# Patient Record
Sex: FEMALE | Race: WHITE | Employment: UNEMPLOYED | URBAN - METROPOLITAN AREA
[De-identification: names, ages, dates, MRNs, and addresses within clinical notes are randomized per-mention and may not be internally consistent; named-entity substitution may affect disease eponyms.]

---

## 2017-01-11 ENCOUNTER — OFFICE VISIT (OUTPATIENT)
Dept: CARDIOLOGY CLINIC | Age: 27
End: 2017-01-11

## 2017-01-11 VITALS
OXYGEN SATURATION: 98 % | HEIGHT: 61 IN | DIASTOLIC BLOOD PRESSURE: 70 MMHG | SYSTOLIC BLOOD PRESSURE: 104 MMHG | WEIGHT: 170.6 LBS | HEART RATE: 64 BPM | RESPIRATION RATE: 16 BRPM | BODY MASS INDEX: 32.21 KG/M2

## 2017-01-11 DIAGNOSIS — R55 SYNCOPE AND COLLAPSE: ICD-10-CM

## 2017-01-11 DIAGNOSIS — R00.0 INAPPROPRIATE SINUS TACHYCARDIA: ICD-10-CM

## 2017-01-11 DIAGNOSIS — R00.0 TACHYCARDIA: Primary | ICD-10-CM

## 2017-01-11 RX ORDER — TOPIRAMATE 50 MG/1
50 TABLET, FILM COATED ORAL 2 TIMES DAILY
COMMUNITY
Start: 2016-12-20 | End: 2017-12-27

## 2017-01-11 RX ORDER — ESCITALOPRAM OXALATE 20 MG/1
20 TABLET ORAL DAILY
COMMUNITY
Start: 2016-12-27

## 2017-01-11 RX ORDER — TRAZODONE HYDROCHLORIDE 50 MG/1
50 TABLET ORAL
COMMUNITY
Start: 2016-12-24 | End: 2017-12-27

## 2017-01-11 NOTE — PROGRESS NOTES
Extended / Orthostatic Vitals:  Patient Position 2: Supine  BP 2: 104/70  Pulse 2: 64  Patient Position 3: Standing  BP 3: 108/80  Pulse 3: 93

## 2017-01-11 NOTE — PROGRESS NOTES
HISTORY OF PRESENTING ILLNESS      Todd Balbuena is a 23 y.o. female established patient with history of CMT who has had episodes of extreme fatigue, lightheadedness, and dizziness that can occur with random activities including bending, sitting, and climbing stairs that were initially associated with HR's ranging from . She has been labeled with POTS in the past and has been on midodrine and florinef. She has also had episodes of syncope. Ivabradine was continued and metoprolol was lowered to 12.5 mg BID and ultimately discontinued due to bradycardia. Subsequent 30 day monitor demonstrated episodes of ST with rates as high as the 160s and revealed numerous episodes of lightheadedness, dizziness, and chest pain that were associated with normal SR, and at least one episode where her HR was in the 160s, asx. Vital signs in the past failed to demonstrate orthostasis. Diltiazem has also resulted in bradycardia. We had discussed sinus node modification however she opted to continue attempt at drug therapy. She was intolerant of flecainide and was thus changed to Rythmol and she now presents for follow up. Overall, she has noticed a significant improvement in her HR control with the highest HR noted during normal exertion now being around the low 100's. However, resting heart rates are in the mid 50's at rest with associated fatigue.        ACTIVE PROBLEM LIST     Patient Active Problem List    Diagnosis Date Noted    Inappropriate sinus tachycardia ([address] 08/11/2016    Tachycardia 07/08/2015    Syncope and collapse 06/27/2012    Bipolar disorder, unspecified ([address] 06/27/2012    Orthostatic hypotension 06/27/2012    UTI (urinary tract infection) 06/27/2012           PAST MEDICAL HISTORY     Past Medical History   Diagnosis Date    Asthma     Orthostatic hypotension     Other ill-defined conditions(799.89)      neurologicaly induced CMT    Psychiatric disorder      bipolar           PAST SURGICAL HISTORY Past Surgical History   Procedure Laterality Date    Hx orthopaedic       bilat feet          ALLERGIES     Allergies   Allergen Reactions    Benadryl [Diphenhydramine Hcl] Other (comments)     hallucinations    Morphine Shortness of Breath          FAMILY HISTORY     Family History   Problem Relation Age of Onset    Cancer Mother     Alcohol abuse Maternal Aunt     Diabetes Maternal Grandmother     Hypertension Maternal Grandmother     Cancer Paternal Grandmother     Diabetes Paternal Grandmother     Alcohol abuse Paternal Grandfather     negative for cardiac disease       SOCIAL HISTORY     Social History     Social History    Marital status: SINGLE     Spouse name: N/A    Number of children: N/A    Years of education: N/A     Social History Main Topics    Smoking status: Never Smoker    Smokeless tobacco: Never Used    Alcohol use No    Drug use: No    Sexual activity: Yes     Partners: Male     Birth control/ protection: Inserts     Other Topics Concern    None     Social History Narrative         MEDICATIONS     Current Outpatient Prescriptions   Medication Sig    traZODone (DESYREL) 50 mg tablet Take 50 mg by mouth nightly.  escitalopram oxalate (LEXAPRO) 20 mg tablet Take 20 mg by mouth daily.  topiramate (TOPAMAX) 50 mg tablet Take 50 mg by mouth two (2) times a day.  propafenone SR (RYTHMOL SR) 225 mg SR capsule Take 1 Cap by mouth two (2) times a day.  clonazePAM (KLONOPIN) 0.5 mg tablet Take 0.5 mg by mouth four (4) times daily.  ivabradine (CORLANOR) 7.5 mg tablet Take 1 Tab by mouth two (2) times daily (with meals).  LAMOTRIGINE (LAMICTAL PO) Take 100 mg by mouth daily.  methylphenidate (CONCERTA) 18 mg CR tablet Take 18 mg by mouth every morning.  NORETHINDRONE (JOLIVETTE PO) Take 0.28 mg by mouth daily.  buPROPion SR (WELLBUTRIN SR) 100 mg SR tablet Take 100 mg by mouth daily. No current facility-administered medications for this visit.         I have reviewed the nurses notes, vitals, problem list, allergy list, medical history, family, social history and medications. REVIEW OF SYMPTOMS      General: Pt denies excessive weight gain or loss. Pt is able to conduct ADL's  HEENT: Denies blurred vision, headaches, hearing loss, epistaxis and difficulty swallowing. Respiratory: Denies cough, congestion, shortness of breath, BRANDON, wheezing or stridor. Cardiovascular: Denies precordial pain, palpitations, edema or PND  Gastrointestinal: Denies poor appetite, indigestion, abdominal pain or blood in stool  Genitourinary: Denies hematuria, dysuria, increased urinary frequency  Musculoskeletal: Denies joint pain or swelling from muscles or joints  Neurologic: Denies tremor, paresthesias, headache, or sensory motor disturbance  Psychiatric: Denies confusion, insomnia, depression  Integumentray: Denies rash, itching or ulcers. Hematologic: Denies easy bruising, bleeding     PHYSICAL EXAMINATION      Vitals:    01/11/17 1144   BP: 104/70   Pulse: 64   Resp: 16   SpO2: 98%   Weight: 170 lb 9.6 oz (77.4 kg)   Height: 5' 1\" (1.549 m)     General: Well developed, in no acute distress. HEENT: No jaundice, oral mucosa moist, no oral ulcers  Neck: Supple, no stiffness, no lymphadenopathy, supple  Heart:  Normal S1/S2 negative S3 or S4. Regular, no murmur, gallop or rub, no jugular venous distention  Respiratory: Clear bilaterally x 4, no wheezing or rales  Abdomen:   Soft, non-tender, bowel sounds are active.   Extremities:  No edema, normal cap refill, no cyanosis. Musculoskeletal: No clubbing, no deformities  Neuro: A&Ox3, speech clear, gait stable, cooperative, no focal neurologic deficits  Skin: Skin color is normal. No rashes or lesions.  Non diaphoretic, moist.  Vascular: 2+ pulses symmetric in all extremities       DIAGNOSTIC DATA      EKG:        LABORATORY DATA      Lab Results   Component Value Date/Time    WBC 5.5 06/27/2012 07:50 PM    HGB 14.5 06/27/2012 07:50 PM    HCT 41.3 06/27/2012 07:50 PM    PLATELET 528 53/89/6310 07:50 PM    MCV 88.2 06/27/2012 07:50 PM      Lab Results   Component Value Date/Time    Sodium 141 06/27/2012 07:50 PM    Potassium 4.2 06/27/2012 07:50 PM    Chloride 106 06/27/2012 07:50 PM    CO2 29 06/27/2012 07:50 PM    Anion gap 6 06/27/2012 07:50 PM    Glucose 83 06/27/2012 07:50 PM    BUN 7 06/27/2012 07:50 PM    Creatinine 0.8 06/27/2012 07:50 PM    BUN/Creatinine ratio 9 06/27/2012 07:50 PM    GFR est AA >60 06/27/2012 07:50 PM    GFR est non-AA >60 06/27/2012 07:50 PM    Calcium 9.2 06/27/2012 07:50 PM    Bilirubin, total 0.2 06/27/2012 07:50 PM    ALT 37 06/27/2012 07:50 PM    AST 14 06/27/2012 07:50 PM    Alk. phosphatase 78 06/27/2012 07:50 PM    Protein, total 7.6 06/27/2012 07:50 PM    Albumin 3.7 06/27/2012 07:50 PM    Globulin 3.9 06/27/2012 07:50 PM    A-G Ratio 0.9 06/27/2012 07:50 PM           ASSESSMENT      1. CMT  2. Dizziness and lightheadedness              A. Occuring randomly with bending, sitting, climbing stairs  3. POTS vs. IST              A. Narrow complex tachycardia consistent with ST on 30 day monitor  4. Fatigue  5. Syncope        PLAN     IST is better controlled however will reduce Corlanor to 5 mg to lessen bradycardic effect while continuing current dose of propafenone. Follow up 3 months        Thank you,  Rosaura Glover. Zoila Stokes MD for involving me in the care of this extraordinarily pleasant female. Please do not hesitate to contact me for further questions/concerns.      Written by Isaac Mercedes, as dictated by Pearley Burkitt, MD.     Pearley Burkitt, MD  Cardiac Electrophysiology / Cardiology    [address] [facility] [address]  [address] [facility], [facility], [address]  [phone] / [phone] Fax   [phone] / [phone] Fax

## 2017-03-10 ENCOUNTER — IP HISTORICAL/CONVERTED ENCOUNTER (OUTPATIENT)
Dept: OTHER | Age: 27
End: 2017-03-10

## 2017-05-03 RX ORDER — PROPAFENONE HYDROCHLORIDE 225 MG/1
CAPSULE, EXTENDED RELEASE ORAL
Qty: 60 CAP | Refills: 2 | Status: SHIPPED | OUTPATIENT
Start: 2017-05-03 | End: 2017-12-27

## 2017-05-03 NOTE — TELEPHONE ENCOUNTER
Requested Prescriptions     Signed Prescriptions Disp Refills    propafenone SR (RYTHMOL SR) 225 mg SR capsule 60 Cap 2     Sig: TAKE ONE CAPSULE BY MOUTH TWICE DAILY     Authorizing Provider: Agapito Abarca     Ordering User: Ricardo Lebron

## 2017-09-07 ENCOUNTER — HOSPITAL ENCOUNTER (EMERGENCY)
Age: 27
Discharge: HOME | End: 2017-09-07
Attending: EMERGENCY MEDICINE
Payer: COMMERCIAL

## 2017-09-07 ENCOUNTER — APPOINTMENT (OUTPATIENT)
Dept: CT IMAGING | Age: 27
End: 2017-09-07
Attending: PHYSICIAN ASSISTANT
Payer: COMMERCIAL

## 2017-09-07 ENCOUNTER — APPOINTMENT (OUTPATIENT)
Dept: GENERAL RADIOLOGY | Age: 27
End: 2017-09-07
Attending: PHYSICIAN ASSISTANT
Payer: COMMERCIAL

## 2017-09-07 VITALS
DIASTOLIC BLOOD PRESSURE: 74 MMHG | BODY MASS INDEX: 34.36 KG/M2 | HEIGHT: 60 IN | HEART RATE: 85 BPM | OXYGEN SATURATION: 100 % | WEIGHT: 175 LBS | SYSTOLIC BLOOD PRESSURE: 107 MMHG | RESPIRATION RATE: 18 BRPM | TEMPERATURE: 97.8 F

## 2017-09-07 DIAGNOSIS — M25.562 ACUTE PAIN OF BOTH KNEES: ICD-10-CM

## 2017-09-07 DIAGNOSIS — M54.6 ACUTE MIDLINE THORACIC BACK PAIN: ICD-10-CM

## 2017-09-07 DIAGNOSIS — G44.309 POST-TRAUMATIC HEADACHE, NOT INTRACTABLE, UNSPECIFIED CHRONICITY PATTERN: ICD-10-CM

## 2017-09-07 DIAGNOSIS — S16.1XXA CERVICAL STRAIN, INITIAL ENCOUNTER: Primary | ICD-10-CM
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
## 2017-09-07 DIAGNOSIS — M25.561 ACUTE PAIN OF BOTH KNEES: ICD-10-CM

## 2017-09-07 DIAGNOSIS — R10.84 ABDOMINAL PAIN, GENERALIZED: ICD-10-CM

## 2017-09-07 LAB
ALBUMIN SERPL-MCNC: 3.6 G/DL (ref 3.5–5)
ALBUMIN/GLOB SERPL: 0.9 {RATIO} (ref 1.1–2.2)
ALP SERPL-CCNC: 83 U/L (ref 45–117)
ALT SERPL-CCNC: 27 U/L (ref 12–78)
ANION GAP BLD CALC-SCNC: 18 MMOL/L (ref 5–15)
APPEARANCE UR: ABNORMAL
AST SERPL-CCNC: 13 U/L (ref 15–37)
BACTERIA URNS QL MICRO: NEGATIVE /HPF
BASOPHILS # BLD: 0 K/UL (ref 0–0.1)
BASOPHILS NFR BLD: 0 % (ref 0–1)
BILIRUB DIRECT SERPL-MCNC: 0.1 MG/DL (ref 0–0.2)
BILIRUB SERPL-MCNC: 0.3 MG/DL (ref 0.2–1)
BILIRUB UR QL: NEGATIVE
BUN BLD-MCNC: 8 MG/DL (ref 9–20)
CA-I BLD-MCNC: 1.16 MMOL/L (ref 1.12–1.32)
CHLORIDE BLD-SCNC: 103 MMOL/L (ref 98–107)
CO2 BLD-SCNC: 26 MMOL/L (ref 21–32)
COLOR UR: ABNORMAL
CREAT BLD-MCNC: 0.9 MG/DL (ref 0.6–1.3)
EOSINOPHIL # BLD: 0.1 K/UL (ref 0–0.4)
EOSINOPHIL NFR BLD: 2 % (ref 0–7)
EPITH CASTS URNS QL MICRO: ABNORMAL /LPF
ERYTHROCYTE [DISTWIDTH] IN BLOOD BY AUTOMATED COUNT: 13.5 % (ref 11.5–14.5)
GLOBULIN SER CALC-MCNC: 3.9 G/DL (ref 2–4)
GLUCOSE BLD-MCNC: 100 MG/DL (ref 65–100)
GLUCOSE UR STRIP.AUTO-MCNC: NEGATIVE MG/DL
HCG UR QL: NEGATIVE
HCT VFR BLD AUTO: 41.5 % (ref 35–47)
HCT VFR BLD CALC: 41 % (ref 35–47)
HGB BLD-MCNC: 13.3 G/DL (ref 11.5–16)
HGB BLD-MCNC: 13.9 GM/DL (ref 11.5–16)
HGB UR QL STRIP: NEGATIVE
HYALINE CASTS URNS QL MICRO: ABNORMAL /LPF (ref 0–5)
KETONES UR QL STRIP.AUTO: NEGATIVE MG/DL
LEUKOCYTE ESTERASE UR QL STRIP.AUTO: NEGATIVE
LIPASE SERPL-CCNC: 149 U/L (ref 73–393)
LYMPHOCYTES # BLD: 1.3 K/UL (ref 0.8–3.5)
LYMPHOCYTES NFR BLD: 22 % (ref 12–49)
MCH RBC QN AUTO: 29.2 PG (ref 26–34)
MCHC RBC AUTO-ENTMCNC: 32 G/DL (ref 30–36.5)
MCV RBC AUTO: 91 FL (ref 80–99)
MONOCYTES # BLD: 0.6 K/UL (ref 0–1)
MONOCYTES NFR BLD: 9 % (ref 5–13)
NEUTS SEG # BLD: 4 K/UL (ref 1.8–8)
NEUTS SEG NFR BLD: 67 % (ref 32–75)
NITRITE UR QL STRIP.AUTO: NEGATIVE
PH UR STRIP: 6.5 [PH] (ref 5–8)
PLATELET # BLD AUTO: 228 K/UL (ref 150–400)
POTASSIUM BLD-SCNC: 3.8 MMOL/L (ref 3.5–5.1)
PROT SERPL-MCNC: 7.5 G/DL (ref 6.4–8.2)
PROT UR STRIP-MCNC: NEGATIVE MG/DL
RBC # BLD AUTO: 4.56 M/UL (ref 3.8–5.2)
RBC #/AREA URNS HPF: ABNORMAL /HPF (ref 0–5)
SERVICE CMNT-IMP: ABNORMAL
SODIUM BLD-SCNC: 142 MMOL/L (ref 136–145)
SP GR UR REFRACTOMETRY: 1.02 (ref 1–1.03)
UROBILINOGEN UR QL STRIP.AUTO: 1 EU/DL (ref 0.2–1)
WBC # BLD AUTO: 6 K/UL (ref 3.6–11)
WBC URNS QL MICRO: ABNORMAL /HPF (ref 0–4)

## 2017-09-07 PROCEDURE — 73562 X-RAY EXAM OF KNEE 3: CPT

## 2017-09-07 PROCEDURE — 80047 BASIC METABLC PNL IONIZED CA: CPT

## 2017-09-07 PROCEDURE — 74011250636 HC RX REV CODE- 250/636: Performed by: PHYSICIAN ASSISTANT

## 2017-09-07 PROCEDURE — 72072 X-RAY EXAM THORAC SPINE 3VWS: CPT

## 2017-09-07 PROCEDURE — 85025 COMPLETE CBC W/AUTO DIFF WBC: CPT | Performed by: PHYSICIAN ASSISTANT

## 2017-09-07 PROCEDURE — 36415 COLL VENOUS BLD VENIPUNCTURE: CPT | Performed by: PHYSICIAN ASSISTANT

## 2017-09-07 PROCEDURE — 74011636320 HC RX REV CODE- 636/320: Performed by: PHYSICIAN ASSISTANT

## 2017-09-07 PROCEDURE — 72125 CT NECK SPINE W/O DYE: CPT

## 2017-09-07 PROCEDURE — 70450 CT HEAD/BRAIN W/O DYE: CPT

## 2017-09-07 PROCEDURE — 80076 HEPATIC FUNCTION PANEL: CPT | Performed by: PHYSICIAN ASSISTANT

## 2017-09-07 PROCEDURE — 74011250637 HC RX REV CODE- 250/637: Performed by: PHYSICIAN ASSISTANT

## 2017-09-07 PROCEDURE — 81025 URINE PREGNANCY TEST: CPT

## 2017-09-07 PROCEDURE — 74011636320 HC RX REV CODE- 636/320: Performed by: RADIOLOGY

## 2017-09-07 PROCEDURE — 81001 URINALYSIS AUTO W/SCOPE: CPT | Performed by: PHYSICIAN ASSISTANT

## 2017-09-07 PROCEDURE — 96360 HYDRATION IV INFUSION INIT: CPT

## 2017-09-07 PROCEDURE — 83690 ASSAY OF LIPASE: CPT | Performed by: PHYSICIAN ASSISTANT

## 2017-09-07 PROCEDURE — 99285 EMERGENCY DEPT VISIT HI MDM: CPT

## 2017-09-07 PROCEDURE — 74177 CT ABD & PELVIS W/CONTRAST: CPT

## 2017-09-07 RX ORDER — ONDANSETRON 4 MG/1
4 TABLET, ORALLY DISINTEGRATING ORAL
Qty: 10 TAB | Refills: 0 | Status: SHIPPED | OUTPATIENT
Start: 2017-09-07 | End: 2017-12-27

## 2017-09-07 RX ORDER — HYDROCODONE BITARTRATE AND ACETAMINOPHEN 5; 325 MG/1; MG/1
1-2 TABLET ORAL
Qty: 15 TAB | Refills: 0 | Status: SHIPPED | OUTPATIENT
Start: 2017-09-07 | End: 2017-12-27

## 2017-09-07 RX ORDER — HYDROCODONE BITARTRATE AND ACETAMINOPHEN 5; 325 MG/1; MG/1
1 TABLET ORAL
Status: COMPLETED | OUTPATIENT
Start: 2017-09-07 | End: 2017-09-07

## 2017-09-07 RX ORDER — IBUPROFEN 600 MG/1
600 TABLET ORAL
Qty: 30 TAB | Refills: 0 | Status: SHIPPED | OUTPATIENT
Start: 2017-09-07 | End: 2018-09-07 | Stop reason: SDUPTHER

## 2017-09-07 RX ORDER — ONDANSETRON 4 MG/1
4 TABLET, ORALLY DISINTEGRATING ORAL
Status: COMPLETED | OUTPATIENT
Start: 2017-09-07 | End: 2017-09-07

## 2017-09-07 RX ADMIN — SODIUM CHLORIDE 1000 ML: 900 INJECTION, SOLUTION INTRAVENOUS at 17:13

## 2017-09-07 RX ADMIN — DIATRIZOATE MEGLUMINE AND DIATRIZOATE SODIUM 30 ML: 600; 100 SOLUTION ORAL; RECTAL at 17:09

## 2017-09-07 RX ADMIN — ONDANSETRON 4 MG: 4 TABLET, ORALLY DISINTEGRATING ORAL at 19:02

## 2017-09-07 RX ADMIN — HYDROCODONE BITARTRATE AND ACETAMINOPHEN 1 TABLET: 5; 325 TABLET ORAL at 18:36

## 2017-09-07 RX ADMIN — IOPAMIDOL 100 ML: 755 INJECTION, SOLUTION INTRAVENOUS at 16:49

## 2017-09-07 NOTE — DISCHARGE INSTRUCTIONS
Abdominal Pain: Care Instructions  Your Care Instructions    Abdominal pain has many possible causes. Some aren't serious and get better on their own in a few days. Others need more testing and treatment. If your pain continues or gets worse, you need to be rechecked and may need more tests to find out what is wrong. You may need surgery to correct the problem. Don't ignore new symptoms, such as fever, nausea and vomiting, urination problems, pain that gets worse, and dizziness. These may be signs of a more serious problem. Your doctor may have recommended a follow-up visit in the next 8 to 12 hours. If you are not getting better, you may need more tests or treatment. The doctor has checked you carefully, but problems can develop later. If you notice any problems or new symptoms, get medical treatment right away. Follow-up care is a key part of your treatment and safety. Be sure to make and go to all appointments, and call your doctor if you are having problems. It's also a good idea to know your test results and keep a list of the medicines you take. How can you care for yourself at home? · Rest until you feel better. · To prevent dehydration, drink plenty of fluids, enough so that your urine is light yellow or clear like water. Choose water and other caffeine-free clear liquids until you feel better. If you have kidney, heart, or liver disease and have to limit fluids, talk with your doctor before you increase the amount of fluids you drink. · If your stomach is upset, eat mild foods, such as rice, dry toast or crackers, bananas, and applesauce. Try eating several small meals instead of two or three large ones. · Wait until 48 hours after all symptoms have gone away before you have spicy foods, alcohol, and drinks that contain caffeine. · Do not eat foods that are high in fat. · Avoid anti-inflammatory medicines such as aspirin, ibuprofen (Advil, Motrin), and naproxen (Aleve).  These can cause stomach upset. Talk to your doctor if you take daily aspirin for another health problem. When should you call for help? Call 911 anytime you think you may need emergency care. For example, call if:  · You passed out (lost consciousness). · You pass maroon or very bloody stools. · You vomit blood or what looks like coffee grounds. · You have new, severe belly pain. Call your doctor now or seek immediate medical care if:  · Your pain gets worse, especially if it becomes focused in one area of your belly. · You have a new or higher fever. · Your stools are black and look like tar, or they have streaks of blood. · You have unexpected vaginal bleeding. · You have symptoms of a urinary tract infection. These may include:  ¨ Pain when you urinate. ¨ Urinating more often than usual.  ¨ Blood in your urine. · You are dizzy or lightheaded, or you feel like you may faint. Watch closely for changes in your health, and be sure to contact your doctor if:  · You are not getting better after 1 day (24 hours). Where can you learn more? Go to http://honeyShopLocketranjana.info/. Enter K817 in the search box to learn more about \"Abdominal Pain: Care Instructions. \"  Current as of: March 20, 2017  Content Version: 11.3  © 7343-3403 [employer]. Care instructions adapted under license by [employer] (which disclaims liability or warranty for this information). If you have questions about a medical condition or this instruction, always ask your healthcare professional. [place] [address] any warranty or liability for your use of this information. Back Pain: Care Instructions  Your Care Instructions    Back pain has many possible causes. It is often related to problems with muscles and ligaments of the back. It may also be related to problems with the nerves, discs, or bones of the back.  Moving, lifting, standing, sitting, or sleeping in an awkward way can strain the back. Sometimes you don't notice the injury until later. Arthritis is another common cause of back pain. Although it may hurt a lot, back pain usually improves on its own within several weeks. Most people recover in 12 weeks or less. Using good home treatment and being careful not to stress your back can help you feel better sooner. Follow-up care is a key part of your treatment and safety. Be sure to make and go to all appointments, and call your doctor if you are having problems. Its also a good idea to know your test results and keep a list of the medicines you take. How can you care for yourself at home? · Sit or lie in positions that are most comfortable and reduce your pain. Try one of these positions when you lie down:  ¨ Lie on your back with your knees bent and supported by large pillows. ¨ Lie on the floor with your legs on the seat of a sofa or chair. Cathi Mould on your side with your knees and hips bent and a pillow between your legs. ¨ Lie on your stomach if it does not make pain worse. · Do not sit up in bed, and avoid soft couches and twisted positions. Bed rest can help relieve pain at first, but it delays healing. Avoid bed rest after the first day of back pain. · Change positions every 30 minutes. If you must sit for long periods of time, take breaks from sitting. Get up and walk around, or lie in a comfortable position. · Try using a heating pad on a low or medium setting for 15 to 20 minutes every 2 or 3 hours. Try a warm shower in place of one session with the heating pad. · You can also try an ice pack for 10 to 15 minutes every 2 to 3 hours. Put a thin cloth between the ice pack and your skin. · Take pain medicines exactly as directed. ¨ If the doctor gave you a prescription medicine for pain, take it as prescribed. ¨ If you are not taking a prescription pain medicine, ask your doctor if you can take an over-the-counter medicine. · Take short walks several times a day.  You can start with 5 to 10 minutes, 3 or 4 times a day, and work up to longer walks. Walk on level surfaces and avoid hills and stairs until your back is better. · Return to work and other activities as soon as you can. Continued rest without activity is usually not good for your back. · To prevent future back pain, do exercises to stretch and strengthen your back and stomach. Learn how to use good posture, safe lifting techniques, and proper body mechanics. When should you call for help? Call your doctor now or seek immediate medical care if:  · You have new or worsening numbness in your legs. · You have new or worsening weakness in your legs. (This could make it hard to stand up.)  · You lose control of your bladder or bowels. Watch closely for changes in your health, and be sure to contact your doctor if:  · Your pain gets worse. · You are not getting better after 2 weeks. Where can you learn more? Go to http://honey-ranjana.info/. Enter E243 in the search box to learn more about \"Back Pain: Care Instructions. \"  Current as of: March 21, 2017  Content Version: 11.3  © 8862-9978 [employer]. Care instructions adapted under license by [employer] (which disclaims liability or warranty for this information). If you have questions about a medical condition or this instruction, always ask your healthcare professional. [address] any warranty or liability for your use of this information. Headache: Care Instructions  Your Care Instructions    Headaches have many possible causes. Most headaches aren't a sign of a more serious problem, and they will get better on their own. Home treatment may help you feel better faster. The doctor has checked you carefully, but problems can develop later. If you notice any problems or new symptoms, get medical treatment right away. Follow-up care is a key part of your treatment and safety.  Be sure to make and go to all appointments, and call your doctor if you are having problems. It's also a good idea to know your test results and keep a list of the medicines you take. How can you care for yourself at home? · Do not drive if you have taken a prescription pain medicine. · Rest in a quiet, dark room until your headache is gone. Close your eyes and try to relax or go to sleep. Don't watch TV or read. · Put a cold, moist cloth or cold pack on the painful area for 10 to 20 minutes at a time. Put a thin cloth between the cold pack and your skin. · Use a warm, moist towel or a heating pad set on low to relax tight shoulder and neck muscles. · Have someone gently massage your neck and shoulders. · Take pain medicines exactly as directed. ¨ If the doctor gave you a prescription medicine for pain, take it as prescribed. ¨ If you are not taking a prescription pain medicine, ask your doctor if you can take an over-the-counter medicine. · Be careful not to take pain medicine more often than the instructions allow, because you may get worse or more frequent headaches when the medicine wears off. · Do not ignore new symptoms that occur with a headache, such as a fever, weakness or numbness, vision changes, or confusion. These may be signs of a more serious problem. To prevent headaches  · Keep a headache diary so you can figure out what triggers your headaches. Avoiding triggers may help you prevent headaches. Record when each headache began, how long it lasted, and what the pain was like (throbbing, aching, stabbing, or dull). Write down any other symptoms you had with the headache, such as nausea, flashing lights or dark spots, or sensitivity to bright light or loud noise. Note if the headache occurred near your period. List anything that might have triggered the headache, such as certain foods (chocolate, cheese, wine) or odors, smoke, bright light, stress, or lack of sleep. · Find healthy ways to deal with stress.  Headaches are most common during or right after stressful times. Take time to relax before and after you do something that has caused a headache in the past.  · Try to keep your muscles relaxed by keeping good posture. Check your jaw, face, neck, and shoulder muscles for tension, and try relaxing them. When sitting at a desk, change positions often, and stretch for 30 seconds each hour. · Get plenty of sleep and exercise. · Eat regularly and well. Long periods without food can trigger a headache. · Treat yourself to a massage. Some people find that regular massages are very helpful in relieving tension. · Limit caffeine by not drinking too much coffee, tea, or soda. But don't quit caffeine suddenly, because that can also give you headaches. · Reduce eyestrain from computers by blinking frequently and looking away from the computer screen every so often. Make sure you have proper eyewear and that your monitor is set up properly, about an arm's length away. · Seek help if you have depression or anxiety. Your headaches may be linked to these conditions. Treatment can both prevent headaches and help with symptoms of anxiety or depression. When should you call for help? Call 911 anytime you think you may need emergency care. For example, call if:  · You have signs of a stroke. These may include:  ¨ Sudden numbness, paralysis, or weakness in your face, arm, or leg, especially on only one side of your body. ¨ Sudden vision changes. ¨ Sudden trouble speaking. ¨ Sudden confusion or trouble understanding simple statements. ¨ Sudden problems with walking or balance. ¨ A sudden, severe headache that is different from past headaches. Call your doctor now or seek immediate medical care if:  · You have a new or worse headache. · Your headache gets much worse. Where can you learn more? Go to http://honey-ranjana.info/.   Enter 2593 0631 in the search box to learn more about \"Headache: Care Instructions. \"  Current as of: October 14, 2016  Content Version: 11.3  © 7094-5190 [employer]. Care instructions adapted under license by [employer] (which disclaims liability or warranty for this information). If you have questions about a medical condition or this instruction, always ask your healthcare professional. [address] any warranty or liability for your use of this information. Neck Strain: Care Instructions  Your Care Instructions  You have strained the muscles and ligaments in your neck. A sudden, awkward movement can strain the neck. This often occurs with falls or car accidents or during certain sports. Everyday activities like working on a computer or sleeping can also cause neck strain if they force you to hold your neck in an awkward position for a long time. It is common for neck pain to get worse for a day or two after an injury, but it should start to feel better after that. You may have more pain and stiffness for several days before it gets better. This is expected. It may take a few weeks or longer for it to heal completely. Good home treatment can help you get better faster and avoid future neck problems. Follow-up care is a key part of your treatment and safety. Be sure to make and go to all appointments, and call your doctor if you are having problems. It's also a good idea to know your test results and keep a list of the medicines you take. How can you care for yourself at home? · If you were given a neck brace (cervical collar) to limit neck motion, wear it as instructed for as many days as your doctor tells you to. Do not wear it longer than you were told to. Wearing a brace for too long can make neck stiffness worse and weaken the neck muscles. · You can try using heat or ice to see if it helps. ¨ Try using a heating pad on a low or medium setting for 15 to 20 minutes every 2 to 3 hours.  Try a warm shower in place of one session with the heating pad. You can also buy single-use heat wraps that last up to 8 hours. ¨ You can also try an ice pack for 10 to 15 minutes every 2 to 3 hours. · Take pain medicines exactly as directed. ¨ If the doctor gave you a prescription medicine for pain, take it as prescribed. ¨ If you are not taking a prescription pain medicine, ask your doctor if you can take an over-the-counter medicine. · Gently rub the area to relieve pain and help with blood flow. Do not massage the area if it hurts to do so. · Do not do anything that makes the pain worse. Take it easy for a couple of days. You can do your usual activities if they do not hurt your neck or put it at risk for more stress or injury. · Try sleeping on a special neck pillow. Place it under your neck, not under your head. Placing a tightly rolled-up towel under your neck while you sleep will also work. If you use a neck pillow or rolled towel, do not use your regular pillow at the same time. · To prevent future neck pain, do exercises to stretch and strengthen your neck and back. Learn how to use good posture, safe lifting techniques, and proper body mechanics. When should you call for help? Call 911 anytime you think you may need emergency care. For example, call if:  · You are unable to move an arm or a leg at all. Call your doctor now or seek immediate medical care if:  · You have new or worse symptoms in your arms, legs, chest, belly, or buttocks. Symptoms may include:  ¨ Numbness or tingling. ¨ Weakness. ¨ Pain. · You lose bladder or bowel control. Watch closely for changes in your health, and be sure to contact your doctor if:  · You are not getting better as expected. Where can you learn more? Go to http://honey-ranjana.info/. Enter M253 in the search box to learn more about \"Neck Strain: Care Instructions. \"  Current as of: March 21, 2017  Content Version: 11.3  © 1571-0736 [employer].  Care instructions adapted under license by [employer] (which disclaims liability or warranty for this information). If you have questions about a medical condition or this instruction, always ask your healthcare professional. [address] any warranty or liability for your use of this information.
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
## 2017-12-07 DIAGNOSIS — R00.0 INAPPROPRIATE SINUS TACHYCARDIA: ICD-10-CM

## 2017-12-07 DIAGNOSIS — R55 SYNCOPE AND COLLAPSE: ICD-10-CM

## 2017-12-07 DIAGNOSIS — R00.0 TACHYCARDIA: ICD-10-CM

## 2017-12-07 NOTE — TELEPHONE ENCOUNTER
Pharmacy verified. Requested Prescriptions     Pending Prescriptions Disp Refills    ivabradine (CORLANOR) 5 mg tablet 60 Tab 6     Sig: Take 1 Tab by mouth two (2) times daily (with meals). Thanks!   Maddie Regna

## 2017-12-08 NOTE — TELEPHONE ENCOUNTER
Refill is per verbal order of Dr. Theresa Abbasi.    Requested Prescriptions     Pending Prescriptions Disp Refills    ivabradine (CORLANOR) 5 mg tablet 60 Tab 0     Sig: Take 1 Tab by mouth two (2) times daily (with meals).

## 2017-12-27 ENCOUNTER — OFFICE VISIT (OUTPATIENT)
Dept: CARDIOLOGY CLINIC | Age: 27
End: 2017-12-27

## 2017-12-27 VITALS
BODY MASS INDEX: 36.08 KG/M2 | RESPIRATION RATE: 16 BRPM | OXYGEN SATURATION: 98 % | HEIGHT: 60 IN | DIASTOLIC BLOOD PRESSURE: 82 MMHG | HEART RATE: 101 BPM | WEIGHT: 183.8 LBS | SYSTOLIC BLOOD PRESSURE: 124 MMHG

## 2017-12-27 DIAGNOSIS — I95.1 ORTHOSTATIC HYPOTENSION: ICD-10-CM

## 2017-12-27 DIAGNOSIS — R00.0 INAPPROPRIATE SINUS TACHYCARDIA: Primary | ICD-10-CM

## 2017-12-27 DIAGNOSIS — R55 SYNCOPE AND COLLAPSE: ICD-10-CM

## 2017-12-27 RX ORDER — ALBUTEROL SULFATE 90 UG/1
AEROSOL, METERED RESPIRATORY (INHALATION)
Refills: 3 | COMMUNITY
Start: 2017-12-20

## 2017-12-27 RX ORDER — ESZOPICLONE 2 MG/1
TABLET, FILM COATED ORAL
COMMUNITY
End: 2017-12-27

## 2017-12-27 RX ORDER — CLOMIPRAMINE HYDROCHLORIDE 50 MG/1
50 CAPSULE ORAL
Status: ON HOLD | COMMUNITY
End: 2019-06-20 | Stop reason: ALTCHOICE

## 2017-12-27 RX ORDER — MONTELUKAST SODIUM 10 MG/1
10 TABLET ORAL DAILY
COMMUNITY
End: 2020-09-01

## 2017-12-27 RX ORDER — LISDEXAMFETAMINE DIMESYLATE 20 MG/1
CAPSULE ORAL
Refills: 0 | COMMUNITY
Start: 2017-11-16

## 2017-12-27 NOTE — PROGRESS NOTES
Extended / Orthostatic Vitals:  Patient Position 2: Supine  BP 2: 124/82  Pulse 2: 101  Patient Position 3: Standing  BP 3: 128/86  Pulse 3: 120

## 2017-12-27 NOTE — MR AVS SNAPSHOT
Visit Information Date & Time Provider Department Dept. Phone Encounter #  
 12/27/2017  3:00 PM Haylee Russell MD [facility] [phone] [id number] Your Appointments 12/27/2017  3:00 PM  
ESTABLISHED PATIENT with Haylee Russell MD  
[facility] ([facility]) Appt Note: follow up colleen'd by pt; follow up colleen'd by pt  
 [address] [facility] [address] [facility]  
[address] [facility] [address] [address] Upcoming Health Maintenance Date Due  
 HPV AGE 9Y-34Y (1 of 3 - Female 3 Dose Series) 6/18/2002 DTaP/Tdap/Td series (1 - Tdap) 6/18/2012 PAP AKA CERVICAL CYTOLOGY 6/18/2012 Influenza Age 5 to Adult 8/1/2017 Allergies as of 12/27/2017  Review Complete On: 9/7/2017 By: Kenneth Sever Severity Noted Reaction Type Reactions Benadryl [Diphenhydramine Hcl]  04/24/2012   Side Effect Other (comments)  
 hallucinations Morphine  04/24/2012   Systemic Shortness of Breath Current Immunizations  Never Reviewed No immunizations on file. Not reviewed this visit Vitals OB Status Smoking Status Having regular periods Never Smoker Your Updated Medication List  
  
   
This list is accurate as of: 12/27/17  2:40 PM.  Always use your most recent med list.  
  
  
  
  
 clonazePAM 0.5 mg tablet Commonly known as:  Randal Trinh Take 0.5 mg by mouth four (4) times daily. CONCERTA 18 mg CR tablet Generic drug:  methylphenidate HCl Take 18 mg by mouth every morning. escitalopram oxalate 20 mg tablet Commonly known as:  Degroot Bias Take 20 mg by mouth daily. HYDROcodone-acetaminophen 5-325 mg per tablet Commonly known as:  Thelbert [place] Take 1-2 Tabs by mouth every four (4) hours as needed for Pain. Max Daily Amount: 12 Tabs. ibuprofen 600 mg tablet Commonly known as:  MOTRIN Take 1 Tab by mouth every eight (8) hours as needed for Pain (take with food). ivabradine 5 mg tablet Commonly known as:  [employer] Take 1 Tab by mouth two (2) times daily (with meals). JOLIVETTE PO Take 0.28 mg by mouth daily. LAMICTAL PO Take 100 mg by mouth daily. * ondansetron 4 mg disintegrating tablet Commonly known as:  ZOFRAN ODT Take 1 Tab by mouth every eight (8) hours as needed for Nausea. * ondansetron 4 mg disintegrating tablet Commonly known as:  ZOFRAN ODT Take 1 Tab by mouth every eight (8) hours as needed for Nausea. propafenone  mg SR capsule Commonly known as:  RYTHMOL SR  
TAKE ONE CAPSULE BY MOUTH TWICE DAILY topiramate 50 mg tablet Commonly known as:  TOPAMAX Take 50 mg by mouth two (2) times a day. traZODone 50 mg tablet Commonly known as:  Te[place]dale Askew Take 50 mg by mouth nightly. WELLBUTRIN  mg SR tablet Generic drug:  buPROPion SR Take 100 mg by mouth daily. * Notice: This list has 2 medication(s) that are the same as other medications prescribed for you. Read the directions carefully, and ask your doctor or other care provider to review them with you. Introducing [facility]! Dear Todd Bro: Thank you for requesting a [employer] account. Our records indicate that you already have an active [employer] account. You can access your account anytime at [email or web address] Did you know that you can access your hospital and ER discharge instructions at any time in [employer]? You can also review all of your test results from your hospital stay or ER visit. Additional Information If you have questions, please visit the Frequently Asked Questions section of the [employer] website at [email or web address] Remember, [employer] is NOT to be used for urgent needs. For medical emergencies, dial 911. Now available from your iPhone and Android!  
  
  
 Please provide this summary of care documentation to your next provider. Your primary care clinician is listed as Chelsy Fuentes. If you have any questions after today's visit, please call [phone].

## 2017-12-27 NOTE — PROGRESS NOTES
HISTORY OF PRESENTING ILLNESS      Earline Balbuena is a 33 y.o. female with history of CMT who has had episodes of extreme fatigue, lightheadedness, and dizziness that can occur with random activities including bending, sitting, and climbing stairs that were initially associated with HR's ranging from . She has been labeled with POTS in the past and has been on midodrine and florinef. She has also had episodes of syncope. Ivabradine was continued and metoprolol was lowered to 12.5 mg BID and ultimately discontinued due to bradycardia. Subsequent 30 day monitor demonstrated episodes of ST with rates as high as the 160s and revealed numerous episodes of lightheadedness, dizziness, and chest pain that were associated with normal SR, and at least one episode where her HR was in the 160s, asx. Vital signs in the past failed to demonstrate orthostasis. Diltiazem has also resulted in bradycardia. We had discussed sinus node modification however she opted to continue attempt at drug therapy. She was intolerant of flecainide and was thus changed to Rythmol. Overall, she has noticed a significant improvement in her HR control with the highest HR noted during normal exertion now being around the low 100's. Last visit, her Corlanor was decreased due to resting heart rates are in the mid 50's at rest with associated fatigue. Since that time, she has self discontinued Rythmol. She reports continued episodes of presyncope as well as one episode of syncope per month, but feels this is as well controlled as she has been.      ACTIVE PROBLEM LIST     Patient Active Problem List    Diagnosis Date Noted    Inappropriate sinus tachycardia 08/11/2016    Tachycardia 07/08/2015    Syncope and collapse 06/27/2012    Bipolar disorder, unspecified 06/27/2012    Orthostatic hypotension 06/27/2012    UTI (urinary tract infection) 06/27/2012           PAST MEDICAL HISTORY     Past Medical History:   Diagnosis Date    Asthma     Orthostatic hypotension     Other ill-defined conditions(799.89)     neurologicaly induced CMT    Psychiatric disorder     bipolar           PAST SURGICAL HISTORY     Past Surgical History:   Procedure Laterality Date    HX ORTHOPAEDIC      bilat feet          ALLERGIES     Allergies   Allergen Reactions    Benadryl [Diphenhydramine Hcl] Other (comments)     hallucinations    Morphine Shortness of Breath    Percocet [Oxycodone-Acetaminophen] Itching          FAMILY HISTORY     Family History   Problem Relation Age of Onset    Cancer Mother     Alcohol abuse Maternal Aunt     Diabetes Maternal Grandmother     Hypertension Maternal Grandmother     Cancer Paternal Grandmother     Diabetes Paternal Grandmother     Alcohol abuse Paternal Grandfather     negative for cardiac disease       SOCIAL HISTORY     Social History     Social History    Marital status: SINGLE     Spouse name: N/A    Number of children: N/A    Years of education: N/A     Social History Main Topics    Smoking status: Never Smoker    Smokeless tobacco: Never Used    Alcohol use No    Drug use: No    Sexual activity: Yes     Partners: Male     Birth control/ protection: Inserts     Other Topics Concern    None     Social History Narrative         MEDICATIONS     Current Outpatient Prescriptions   Medication Sig    beclomethasone (QVAR) 40 mcg/actuation aero Take 1 Puff by inhalation two (2) times a day.  VYVANSE 10 mg capsule TK 1 C PO ONCE D    montelukast (SINGULAIR) 10 mg tablet Take 10 mg by mouth daily.  Cetirizine (ZYRTEC) 10 mg cap Take  by mouth daily.  clomiPRAMINE (ANAFRANIL) 50 mg capsule Take 50 mg by mouth two (2) times a day.  ivabradine (CORLANOR) 5 mg tablet Take 1 Tab by mouth two (2) times daily (with meals).  ibuprofen (MOTRIN) 600 mg tablet Take 1 Tab by mouth every eight (8) hours as needed for Pain (take with food).  escitalopram oxalate (LEXAPRO) 20 mg tablet Take 20 mg by mouth daily.  clonazePAM (KLONOPIN) 0.5 mg tablet Take 0.5 mg by mouth four (4) times daily.  LAMOTRIGINE (LAMICTAL PO) Take 100 mg by mouth daily.  methylphenidate (CONCERTA) 18 mg CR tablet Take 18 mg by mouth every morning.  NORETHINDRONE (JOLIVETTE PO) Take 0.28 mg by mouth daily. No current facility-administered medications for this visit. I have reviewed the nurses notes, vitals, problem list, allergy list, medical history, family, social history and medications. REVIEW OF SYMPTOMS      General: Pt denies excessive weight gain or loss. Pt is able to conduct ADL's  HEENT: Denies blurred vision, headaches, hearing loss, epistaxis and difficulty swallowing. Respiratory: Denies cough, congestion, shortness of breath, BRANDON, wheezing or stridor. Cardiovascular: +palpitations, Denies precordial pain,  edema or PND  Gastrointestinal: Denies poor appetite, indigestion, abdominal pain or blood in stool  Genitourinary: Denies hematuria, dysuria, increased urinary frequency  Musculoskeletal: Denies joint pain or swelling from muscles or joints  Neurologic: +dizziness, syncopeDenies tremor, paresthesias, headache, or sensory motor disturbance  Psychiatric: Denies confusion, insomnia, depression    Integumentray: Denies rash, itching or ulcers. Hematologic: Denies easy bruising, bleeding       PHYSICAL EXAMINATION      Vitals:    12/27/17 1444   BP: 124/82   Pulse: (!) 101   Resp: 16   SpO2: 98%   Weight: 183 lb 12.8 oz (83.4 kg)   Height: 5' (1.524 m)     General: Well developed, in no acute distress. HEENT: No jaundice, oral mucosa moist, no oral ulcers  Neck: Supple, no stiffness, no lymphadenopathy, supple  Heart:  Normal S1/S2 negative S3 or S4. Regular, no murmur, gallop or rub, no jugular venous distention  Respiratory: Clear bilaterally x 4, no wheezing or rales  Abdomen:   Soft, non-tender, bowel sounds are active.   Extremities:  No edema, normal cap refill, no cyanosis.   Musculoskeletal: No clubbing, no deformities  Neuro: A&Ox3, speech clear, gait stable, cooperative, no focal neurologic deficits  Skin: Skin color is normal. No rashes or lesions. Non diaphoretic, moist.  Vascular: 2+ pulses symmetric in all extremities       DIAGNOSTIC DATA      EKG: sinus tachycardia      LABORATORY DATA      Lab Results   Component Value Date/Time    WBC 6.0 09/07/2017 05:08 PM    Hemoglobin (POC) 13.9 09/07/2017 05:10 PM    HGB 13.3 09/07/2017 05:08 PM    Hematocrit (POC) 41 09/07/2017 05:10 PM    HCT 41.5 09/07/2017 05:08 PM    PLATELET 986 53/70/9033 05:08 PM    MCV 91.0 09/07/2017 05:08 PM      Lab Results   Component Value Date/Time    Sodium 141 06/27/2012 07:50 PM    Potassium 4.2 06/27/2012 07:50 PM    Chloride 106 06/27/2012 07:50 PM    CO2 29 06/27/2012 07:50 PM    Anion gap 6 06/27/2012 07:50 PM    Glucose 83 06/27/2012 07:50 PM    BUN 7 06/27/2012 07:50 PM    Creatinine 0.8 06/27/2012 07:50 PM    BUN/Creatinine ratio 9 06/27/2012 07:50 PM    GFR est AA >60 06/27/2012 07:50 PM    GFR est non-AA >60 06/27/2012 07:50 PM    Calcium 9.2 06/27/2012 07:50 PM    Bilirubin, total 0.3 09/07/2017 05:08 PM    AST (SGOT) 13 09/07/2017 05:08 PM    Alk. phosphatase 83 09/07/2017 05:08 PM    Protein, total 7.5 09/07/2017 05:08 PM    Albumin 3.6 09/07/2017 05:08 PM    Globulin 3.9 09/07/2017 05:08 PM    A-G Ratio 0.9 09/07/2017 05:08 PM    ALT (SGPT) 27 09/07/2017 05:08 PM           ASSESSMENT      1. CMT  2. Dizziness and lightheadedness              A. Occuring randomly with bending, sitting, climbing stairs  3. POTS vs. IST              A. Narrow complex tachycardia consistent with ST on 30 day monitor  4. Fatigue  5. Syncope      PLAN     She would like to continue with current medical therapy/ treatment plan. I advised her to return to aggressive hydration and start exercise, specifically strengthening lower extremity muscles. She would like referral to [facility]. FOLLOW-UP   1 year    Thank you, Asif Bloom. Chrissy Cobb MD for allowing me to participate in the care of this extraordinarily pleasant female. Please do not hesitate to contact me for further questions/concerns.        GUILHERME Cheema [facility] [address].  [address] [facility], [address] [place] [address]    [facility]  [phone] / [phone] Fax   [phone] / [phone] Fax

## 2018-02-01 DIAGNOSIS — R00.0 TACHYCARDIA: ICD-10-CM

## 2018-02-01 DIAGNOSIS — R55 SYNCOPE AND COLLAPSE: ICD-10-CM

## 2018-02-01 DIAGNOSIS — R00.0 INAPPROPRIATE SINUS TACHYCARDIA: ICD-10-CM

## 2018-02-01 NOTE — TELEPHONE ENCOUNTER
Requested Prescriptions     Signed Prescriptions Disp Refills    ivabradine (CORLANOR) 5 mg tablet 180 Tab 2     Sig: Take 1 Tab by mouth two (2) times daily (with meals). Authorizing Provider: Tomasz Cabral     Ordering User: Rashaad Buckner     Per verbal order Dr. Jayro Jernigan.

## 2018-02-01 NOTE — TELEPHONE ENCOUNTER
Requested Prescriptions     Pending Prescriptions Disp Refills    ivabradine (CORLANOR) 5 mg tablet 60 Tab 0     Sig: Take 1 Tab by mouth two (2) times daily (with meals).      Last OV 12/27/17  Next OV not scheduled    Pharmacy verified  90 day supply    Thank you, DANIEL

## 2018-02-02 DIAGNOSIS — R55 SYNCOPE AND COLLAPSE: ICD-10-CM

## 2018-02-02 DIAGNOSIS — R00.0 INAPPROPRIATE SINUS TACHYCARDIA: ICD-10-CM

## 2018-02-02 DIAGNOSIS — R00.0 TACHYCARDIA: ICD-10-CM

## 2018-02-06 ENCOUNTER — DOCUMENTATION ONLY (OUTPATIENT)
Dept: CARDIOLOGY CLINIC | Age: 28
End: 2018-02-06

## 2018-02-13 ENCOUNTER — TELEPHONE (OUTPATIENT)
Dept: CARDIOLOGY CLINIC | Age: 28
End: 2018-02-13

## 2018-02-13 NOTE — TELEPHONE ENCOUNTER
Received call, ID verified using two patient identifiers, advised patient that her insurance company has denied coverage for her Corlanor and that we are in the process of appealing the denial.Patient states she has run out of Corlanor and is having tachycardia and has fallen 3 times. She is requesting that Dr. Brigid Mattson prescribe something to control her HR until we hear from Ciro Douglas about Corlanor appeal. Advised patient that I would relay information to Dr. Brigid Mattson for his recommendations. Patient agreeable to this plan.

## 2018-02-13 NOTE — TELEPHONE ENCOUNTER
Called patient, no answer, left message to have patient return call to [phone]. Need to let her know that her insurance company has denied coverage for her Corlanor.

## 2018-02-15 RX ORDER — METOPROLOL SUCCINATE 25 MG/1
25 TABLET, EXTENDED RELEASE ORAL DAILY
Qty: 30 TAB | Refills: 3 | Status: SHIPPED | OUTPATIENT
Start: 2018-02-15 | End: 2018-09-07

## 2018-02-15 NOTE — TELEPHONE ENCOUNTER
Patient returned call, ID verified using two patient identifiers, patient states she would like to try Metoprolol while waiting for insurance decision on appeal for Corlanor. Advised patient that I would relay information to DREA Suggs NP for her to give  order for Metoprolol. Pharmacy verified with patient.

## 2018-02-15 NOTE — TELEPHONE ENCOUNTER
Called patient, no answer left message to have patient return call to [phone]. Per DREA Sky NP \" We can prescribe Rythmol, or something she's been on in the past in the interim,while we are working on appeal with insurance company for Horacio Galicia. \"

## 2018-02-15 NOTE — TELEPHONE ENCOUNTER
Metoprolol Succ 25 mg 1 Tab by mouth daily sent to [address] [facility] MARGARITA Sky NP. To be taken in place of Corlanor.

## 2018-02-21 ENCOUNTER — OFFICE VISIT (OUTPATIENT)
Dept: CARDIOLOGY CLINIC | Age: 28
End: 2018-02-21

## 2018-02-21 VITALS — BODY MASS INDEX: 37.11 KG/M2 | HEIGHT: 60 IN | WEIGHT: 189 LBS | OXYGEN SATURATION: 99 %

## 2018-02-21 DIAGNOSIS — R00.0 TACHYCARDIA: Primary | ICD-10-CM

## 2018-02-21 DIAGNOSIS — I95.1 ORTHOSTATIC HYPOTENSION: ICD-10-CM

## 2018-02-21 DIAGNOSIS — R00.0 INAPPROPRIATE SINUS TACHYCARDIA: ICD-10-CM

## 2018-02-21 DIAGNOSIS — R55 SYNCOPE AND COLLAPSE: ICD-10-CM

## 2018-02-21 RX ORDER — FLUTICASONE FUROATE AND VILANTEROL 100; 25 UG/1; UG/1
1 POWDER RESPIRATORY (INHALATION) DAILY
COMMUNITY
End: 2018-09-07 | Stop reason: DRUGHIGH

## 2018-02-21 NOTE — PROGRESS NOTES
HISTORY OF PRESENTING ILLNESS      Oracio Barragan is a 33 y.o. female with history of CMT who has had episodes of extreme fatigue, lightheadedness, and dizziness that can occur with random activities including bending, sitting, and climbing stairs that were initially associated with HR's ranging from . She has been labeled with POTS in the past and has been on midodrine and florinef. She has also had episodes of syncope. Ivabradine was continued and metoprolol was lowered to 12.5 mg BID and ultimately discontinued due to bradycardia. She has also been intolerant of diltiazem, Rythmol and Flecainide. 30 day monitor demonstrated episodes of ST with rates as high as the 160s and revealed numerous episodes of lightheadedness, dizziness, and chest pain that were associated with normal SR, and at least one episode where her HR was in the 160s, asx. Overall, she has noticed a significant improvement in her HR control with the highest HR noted during normal exertion now being around the low 100's. Last visit, her Corlanor was decreased due to resting heart rates are in the mid 50's at rest with associated fatigue. She reports continued episodes of presyncope as well as one episode of syncope per month, but feels Corlanor has provided the most well controlled treatment response thus far. Last visit, we continued current medical therapy; however, since then insurance has denied coverage for her Corlanor. She is having syncope daily now and her symptoms of palpitations/tachycardia and dizziness have worsened.         ACTIVE PROBLEM LIST     Patient Active Problem List    Diagnosis Date Noted    Inappropriate sinus tachycardia 08/11/2016    Tachycardia 07/08/2015    Syncope and collapse 06/27/2012    Bipolar disorder, unspecified 06/27/2012    Orthostatic hypotension 06/27/2012    UTI (urinary tract infection) 06/27/2012           PAST MEDICAL HISTORY     Past Medical History:   Diagnosis Date    Asthma     Orthostatic hypotension     Other ill-defined conditions(799.89)     neurologicaly induced CMT    Psychiatric disorder     bipolar           PAST SURGICAL HISTORY     Past Surgical History:   Procedure Laterality Date    HX ORTHOPAEDIC      bilat feet          ALLERGIES     Allergies   Allergen Reactions    Benadryl [Diphenhydramine Hcl] Other (comments)     hallucinations    Morphine Shortness of Breath    Percocet [Oxycodone-Acetaminophen] Itching          FAMILY HISTORY     Family History   Problem Relation Age of Onset    Cancer Mother     Alcohol abuse Maternal Aunt     Diabetes Maternal Grandmother     Hypertension Maternal Grandmother     Cancer Paternal Grandmother     Diabetes Paternal Grandmother     Alcohol abuse Paternal Grandfather     negative for cardiac disease       SOCIAL HISTORY     Social History     Social History    Marital status: SINGLE     Spouse name: N/A    Number of children: N/A    Years of education: N/A     Social History Main Topics    Smoking status: Never Smoker    Smokeless tobacco: Never Used    Alcohol use No    Drug use: No    Sexual activity: Yes     Partners: Male     Birth control/ protection: Inserts     Other Topics Concern    None     Social History Narrative         MEDICATIONS     Current Outpatient Prescriptions   Medication Sig    fluticasone-vilanterol (BREO ELLIPTA) 100-25 mcg/dose inhaler Take 1 Puff by inhalation daily.  metoprolol succinate (TOPROL-XL) 25 mg XL tablet Take 1 Tab by mouth daily. Indications: to be taken in place of corlanor    VYVANSE 10 mg capsule TK 1 C PO ONCE D    montelukast (SINGULAIR) 10 mg tablet Take 10 mg by mouth daily.  Cetirizine (ZYRTEC) 10 mg cap Take  by mouth daily.  clomiPRAMINE (ANAFRANIL) 50 mg capsule Take 50 mg by mouth two (2) times a day.     PROAIR HFA 90 mcg/actuation inhaler INHALE 2 PUFFS PO Q 4 TO 6 H PRN AND 10-15 MINUTES BEFORE EXERCISE    ibuprofen (MOTRIN) 600 mg tablet Take 1 Tab by mouth every eight (8) hours as needed for Pain (take with food).  escitalopram oxalate (LEXAPRO) 20 mg tablet Take 20 mg by mouth daily.  clonazePAM (KLONOPIN) 0.5 mg tablet Take 0.5 mg by mouth four (4) times daily.  LAMOTRIGINE (LAMICTAL PO) Take 100 mg by mouth daily.  NORETHINDRONE (JOLIVETTE PO) Take 0.28 mg by mouth daily.  ivabradine (CORLANOR) 5 mg tablet Take 1 Tab by mouth two (2) times daily (with meals).  beclomethasone (QVAR) 40 mcg/actuation aero Take 1 Puff by inhalation two (2) times a day. No current facility-administered medications for this visit. I have reviewed the nurses notes, vitals, problem list, allergy list, medical history, family, social history and medications. REVIEW OF SYMPTOMS      General: Pt denies excessive weight gain or loss. Pt is able to conduct ADL's  HEENT: Denies blurred vision, headaches, hearing loss, epistaxis and difficulty swallowing. Respiratory: Denies cough, congestion, shortness of breath, BRANDON, wheezing or stridor. Cardiovascular: +palpitations, Denies precordial pain, edema or PND  Gastrointestinal: Denies poor appetite, indigestion, abdominal pain or blood in stool  Genitourinary: Denies hematuria, dysuria, increased urinary frequency  Musculoskeletal: Denies joint pain or swelling from muscles or joints  Neurologic: +dizziness, Denies tremor, paresthesias, headache, or sensory motor disturbance  Psychiatric: Denies confusion, insomnia, depression  Integumentray: Denies rash, itching or ulcers. Hematologic: Denies easy bruising, bleeding       PHYSICAL EXAMINATION      Vitals:    02/21/18 1344   SpO2: 99%   Weight: 189 lb (85.7 kg)   Height: 5' (1.524 m)     General: Well developed, in no acute distress. HEENT: No jaundice, oral mucosa moist, no oral ulcers  Neck: Supple, no stiffness, no lymphadenopathy, supple  Heart:  Normal S1/S2 negative S3 or S4.  Regular, no murmur, gallop or rub, no jugular venous distention  Respiratory: Clear bilaterally x 4, no wheezing or rales  Abdomen:   Soft, non-tender, bowel sounds are active.   Extremities:  No edema, normal cap refill, no cyanosis. Musculoskeletal: No clubbing, no deformities  Neuro: A&Ox3, speech clear, gait stable, cooperative, no focal neurologic deficits  Skin: Skin color is normal. No rashes or lesions. Non diaphoretic, moist.  Vascular: 2+ pulses symmetric in all extremities       DIAGNOSTIC DATA      EKG: sinus rhythm        LABORATORY DATA      Lab Results   Component Value Date/Time    WBC 6.0 09/07/2017 05:08 PM    Hemoglobin (POC) 13.9 09/07/2017 05:10 PM    HGB 13.3 09/07/2017 05:08 PM    Hematocrit (POC) 41 09/07/2017 05:10 PM    HCT 41.5 09/07/2017 05:08 PM    PLATELET 841 39/62/5313 05:08 PM    MCV 91.0 09/07/2017 05:08 PM      Lab Results   Component Value Date/Time    Sodium 141 06/27/2012 07:50 PM    Potassium 4.2 06/27/2012 07:50 PM    Chloride 106 06/27/2012 07:50 PM    CO2 29 06/27/2012 07:50 PM    Anion gap 6 06/27/2012 07:50 PM    Glucose 83 06/27/2012 07:50 PM    BUN 7 06/27/2012 07:50 PM    Creatinine 0.8 06/27/2012 07:50 PM    BUN/Creatinine ratio 9 (L) 06/27/2012 07:50 PM    GFR est AA >60 06/27/2012 07:50 PM    GFR est non-AA >60 06/27/2012 07:50 PM    Calcium 9.2 06/27/2012 07:50 PM    Bilirubin, total 0.3 09/07/2017 05:08 PM    AST (SGOT) 13 (L) 09/07/2017 05:08 PM    Alk. phosphatase 83 09/07/2017 05:08 PM    Protein, total 7.5 09/07/2017 05:08 PM    Albumin 3.6 09/07/2017 05:08 PM    Globulin 3.9 09/07/2017 05:08 PM    A-G Ratio 0.9 (L) 09/07/2017 05:08 PM    ALT (SGPT) 27 09/07/2017 05:08 PM           ASSESSMENT      1. CMT  2. Dizziness and lightheadedness              A. Occuring randomly with bending, sitting, climbing stairs  3. POTS vs. IST              A. Narrow complex tachycardia consistent with ST on 30 day monitor  4. Fatigue  5.  Syncope      PLAN     Will continue to work with insurance company to appeal Corlanor denial. Case d/w Dr. Alisia Contreras who prefers avoiding sinus node modification due to high risk of procedure. FOLLOW-UP   1 month    Thank you, No primary care provider on file. for allowing me to participate in the care of this extraordinarily pleasant female. Please do not hesitate to contact me for further questions/concerns.      GUILHERME Gracia [facility].  [address] [place] [address], [facility] [address].    [place], [facility] [address] [facility] [address]  [phone] / [phone] Fax   [phone] / [phone] Fax

## 2018-02-21 NOTE — PROGRESS NOTES
Extended / Orthostatic Vitals:  Patient Position 2: Supine  BP 2: 90/60  Pulse 2: 99  Patient Position 3: Sitting  BP 3: 100/78  Pulse 3: 117  Patient Position 4: Standing  BP 4: 90/68  Pulse 4: 116     Visit Vitals    Ht 5' (1.524 m)    Wt 189 lb (85.7 kg)    SpO2 99%    BMI 36.91 kg/m2

## 2018-02-22 ENCOUNTER — TELEPHONE (OUTPATIENT)
Dept: CARDIOLOGY CLINIC | Age: 28
End: 2018-02-22

## 2018-02-22 DIAGNOSIS — R00.0 INAPPROPRIATE SINUS TACHYCARDIA: ICD-10-CM

## 2018-02-22 DIAGNOSIS — R00.0 TACHYCARDIA: ICD-10-CM

## 2018-02-22 DIAGNOSIS — R55 SYNCOPE AND COLLAPSE: ICD-10-CM

## 2018-02-22 NOTE — TELEPHONE ENCOUNTER
Requested Prescriptions     Signed Prescriptions Disp Refills    ivabradine (CORLANOR) 5 mg tablet 56 Tab 0     Sig: Take 1 Tab by mouth two (2) times daily (with meals).      Authorizing Provider: Loyda Sanders     Ordering User: Josseline Garsia     Verbal order for refill per Zara Cole

## 2018-02-22 NOTE — TELEPHONE ENCOUNTER
Called patient regarding picking up samples of corlanor 5mg and signing consent paperwork needed to continue insurance appeal for coverage. Voicemail message left. Paperwork pending patient signature.

## 2018-03-26 ENCOUNTER — TELEPHONE (OUTPATIENT)
Dept: CARDIOLOGY CLINIC | Age: 28
End: 2018-03-26

## 2018-03-26 NOTE — TELEPHONE ENCOUNTER
Pt calling because she ran out of samples of Corlanor and she needs to see if her insurance is covering the medication. She can be reached @ [phone]. Thanks!   Eder Lyon

## 2018-03-27 NOTE — TELEPHONE ENCOUNTER
Returned call, no answer, left message to return call to [phone]. Corlanor appeal was denied. Per DREA Vick NP we need to know what medication in the past has been most effective for her. Patient also due for one month follow up and needs to schedule an appointment.

## 2018-04-20 ENCOUNTER — TELEPHONE (OUTPATIENT)
Dept: CARDIOLOGY CLINIC | Age: 28
End: 2018-04-20

## 2018-04-20 NOTE — TELEPHONE ENCOUNTER
Called patient, ID verified using two patient identifiers, informed patient that we do not have any Corlanor samples available. Advised patient that her insurance has denied her request to cover Corlanor. I will have our  call her to set up an appointment with Dr. Gia Taylor or DREA Sky NP to discuss her plan of care and medications. Patient is agreeable to this plan.

## 2018-04-20 NOTE — TELEPHONE ENCOUNTER
Patient would like to  samples of the Corlanor. Patient is waiting for her insurance to come through and she is out of the medication. Thanks!   Phone [phone]  Claudette Rule

## 2018-04-25 ENCOUNTER — TELEPHONE (OUTPATIENT)
Dept: CARDIOLOGY CLINIC | Age: 28
End: 2018-04-25

## 2018-04-25 ENCOUNTER — OFFICE VISIT (OUTPATIENT)
Dept: CARDIOLOGY CLINIC | Age: 28
End: 2018-04-25

## 2018-04-25 DIAGNOSIS — R00.0 INAPPROPRIATE SINUS TACHYCARDIA: Primary | ICD-10-CM

## 2018-04-25 NOTE — TELEPHONE ENCOUNTER
Pt rescheduled her appointment from today to 5/9 and she was asking if she can  some samples of corlanor. Pt can be reached @  [phone]. Thanks!

## 2018-04-25 NOTE — PROGRESS NOTES
HISTORY OF PRESENTING ILLNESS      Yanet Kaur is a 33 y.o. female with history of CMT who has had episodes of extreme fatigue, lightheadedness, and dizziness that can occur with random activities including bending, sitting, and climbing stairs that were initially associated with HR's ranging from . She has been labeled with POTS in the past and has been on midodrine and florinef. She has also had episodes of syncope. Ivabradine was continued and metoprolol was lowered to 12.5 mg BID and ultimately discontinued due to bradycardia. She has also been intolerant of diltiazem, Rythmol and Flecainide. 30 day monitor demonstrated episodes of ST with rates as high as the 160s and revealed numerous episodes of lightheadedness, dizziness, and chest pain that were associated with normal SR, and at least one episode where her HR was in the 160s, asx. Overall, she has noticed a significant improvement in her HR control with the highest HR noted during normal exertion now being around the low 100's. Last visit, her Corlanor was decreased due to resting heart rates are in the mid 50's at rest with associated fatigue. She reports continued episodes of presyncope as well as one episode of syncope per month, but feels Corlanor has provided the most well controlled treatment response thus far. Last visit, we continued current medical therapy; however, since then insurance has denied coverage for her Corlanor. She is having syncope daily now and her symptoms of palpitations/tachycardia and dizziness have worsened.         ACTIVE PROBLEM LIST     Patient Active Problem List    Diagnosis Date Noted    Inappropriate sinus tachycardia 08/11/2016    Tachycardia 07/08/2015    Syncope and collapse 06/27/2012    Bipolar disorder, unspecified ([facility] [address] 06/27/2012    Orthostatic hypotension 06/27/2012    UTI (urinary tract infection) 06/27/2012           PAST MEDICAL HISTORY     Past Medical History:   Diagnosis Date    Asthma     Orthostatic hypotension     Other ill-defined conditions(799.89)     neurologicaly induced CMT    Psychiatric disorder     bipolar           PAST SURGICAL HISTORY     Past Surgical History:   Procedure Laterality Date    HX ORTHOPAEDIC      bilat feet          ALLERGIES     Allergies   Allergen Reactions    Benadryl [Diphenhydramine Hcl] Other (comments)     hallucinations    Morphine Shortness of Breath    Percocet [Oxycodone-Acetaminophen] Itching          FAMILY HISTORY     Family History   Problem Relation Age of Onset    Cancer Mother     Alcohol abuse Maternal Aunt     Diabetes Maternal Grandmother     Hypertension Maternal Grandmother     Cancer Paternal Grandmother     Diabetes Paternal Grandmother     Alcohol abuse Paternal Grandfather     negative for cardiac disease       SOCIAL HISTORY     Social History     Social History    Marital status: SINGLE     Spouse name: N/A    Number of children: N/A    Years of education: N/A     Social History Main Topics    Smoking status: Never Smoker    Smokeless tobacco: Never Used    Alcohol use No    Drug use: No    Sexual activity: Yes     Partners: Male     Birth control/ protection: Inserts     Other Topics Concern    Not on file     Social History Narrative         MEDICATIONS     Current Outpatient Prescriptions   Medication Sig    ivabradine (CORLANOR) 5 mg tablet Take 1 Tab by mouth two (2) times daily (with meals).  fluticasone-vilanterol (BREO ELLIPTA) 100-25 mcg/dose inhaler Take 1 Puff by inhalation daily.  metoprolol succinate (TOPROL-XL) 25 mg XL tablet Take 1 Tab by mouth daily. Indications: to be taken in place of corlanor    beclomethasone (QVAR) 40 mcg/actuation aero Take 1 Puff by inhalation two (2) times a day.  VYVANSE 10 mg capsule TK 1 C PO ONCE D    montelukast (SINGULAIR) 10 mg tablet Take 10 mg by mouth daily.  Cetirizine (ZYRTEC) 10 mg cap Take  by mouth daily.     clomiPRAMINE (ANAFRANIL) 50 mg capsule Take 50 mg by mouth two (2) times a day.  PROAIR HFA 90 mcg/actuation inhaler INHALE 2 PUFFS PO Q 4 TO 6 H PRN AND 10-15 MINUTES BEFORE EXERCISE    ibuprofen (MOTRIN) 600 mg tablet Take 1 Tab by mouth every eight (8) hours as needed for Pain (take with food).  escitalopram oxalate (LEXAPRO) 20 mg tablet Take 20 mg by mouth daily.  clonazePAM (KLONOPIN) 0.5 mg tablet Take 0.5 mg by mouth four (4) times daily.  LAMOTRIGINE (LAMICTAL PO) Take 100 mg by mouth daily.  NORETHINDRONE (JOLIVETTE PO) Take 0.28 mg by mouth daily. No current facility-administered medications for this visit. I have reviewed the nurses notes, vitals, problem list, allergy list, medical history, family, social history and medications. REVIEW OF SYMPTOMS      General: Pt denies excessive weight gain or loss. Pt is able to conduct ADL's  HEENT: Denies blurred vision, headaches, hearing loss, epistaxis and difficulty swallowing. Respiratory: Denies cough, congestion, shortness of breath, BRANDON, wheezing or stridor. Cardiovascular: Denies precordial pain, palpitations, edema or PND  Gastrointestinal: Denies poor appetite, indigestion, abdominal pain or blood in stool  Genitourinary: Denies hematuria, dysuria, increased urinary frequency  Musculoskeletal: Denies joint pain or swelling from muscles or joints  Neurologic: Denies tremor, paresthesias, headache, or sensory motor disturbance  Psychiatric: Denies confusion, insomnia, depression  Integumentray: Denies rash, itching or ulcers. Hematologic: Denies easy bruising, bleeding       PHYSICAL EXAMINATION      There were no vitals filed for this visit. General: Well developed, in no acute distress. HEENT: No jaundice, oral mucosa moist, no oral ulcers  Neck: Supple, no stiffness, no lymphadenopathy, supple  Heart:  Normal S1/S2 negative S3 or S4.  Regular, no murmur, gallop or rub, no jugular venous distention  Respiratory: Clear bilaterally x 4, no wheezing or rales  Abdomen:   Soft, non-tender, bowel sounds are active.   Extremities:  No edema, normal cap refill, no cyanosis. Musculoskeletal: No clubbing, no deformities  Neuro: A&Ox3, speech clear, gait stable, cooperative, no focal neurologic deficits  Skin: Skin color is normal. No rashes or lesions. Non diaphoretic, moist.  Vascular: 2+ pulses symmetric in all extremities       DIAGNOSTIC DATA      EKG:        LABORATORY DATA      Lab Results   Component Value Date/Time    WBC 6.0 09/07/2017 05:08 PM    Hemoglobin (POC) 13.9 09/07/2017 05:10 PM    HGB 13.3 09/07/2017 05:08 PM    Hematocrit (POC) 41 09/07/2017 05:10 PM    HCT 41.5 09/07/2017 05:08 PM    PLATELET 096 96/73/2250 05:08 PM    MCV 91.0 09/07/2017 05:08 PM      Lab Results   Component Value Date/Time    Sodium 141 06/27/2012 07:50 PM    Potassium 4.2 06/27/2012 07:50 PM    Chloride 106 06/27/2012 07:50 PM    CO2 29 06/27/2012 07:50 PM    Anion gap 6 06/27/2012 07:50 PM    Glucose 83 06/27/2012 07:50 PM    BUN 7 06/27/2012 07:50 PM    Creatinine 0.8 06/27/2012 07:50 PM    BUN/Creatinine ratio 9 (L) 06/27/2012 07:50 PM    GFR est AA >60 06/27/2012 07:50 PM    GFR est non-AA >60 06/27/2012 07:50 PM    Calcium 9.2 06/27/2012 07:50 PM    Bilirubin, total 0.3 09/07/2017 05:08 PM    AST (SGOT) 13 (L) 09/07/2017 05:08 PM    Alk. phosphatase 83 09/07/2017 05:08 PM    Protein, total 7.5 09/07/2017 05:08 PM    Albumin 3.6 09/07/2017 05:08 PM    Globulin 3.9 09/07/2017 05:08 PM    A-G Ratio 0.9 (L) 09/07/2017 05:08 PM    ALT (SGPT) 27 09/07/2017 05:08 PM           ASSESSMENT      1. CMT  2. Dizziness and lightheadedness              A. Occuring randomly with bending, sitting, climbing stairs  3. POTS vs. IST              A. Narrow complex tachycardia consistent with ST on 30 day monitor  4. Fatigue  5. Syncope      PLAN          FOLLOW-UP       Thank you, No primary care provider on file.  for allowing me to participate in the care of this extraordinarily pleasant female. Please do not hesitate to contact me for further questions/concerns.          Liv Alejo MD  Cardiac Electrophysiology / Cardiology    [address]  [address] [facility], [facility], [place] [address]  [facility] [place]  [phone] / [phone] Fax   [phone] / [phone] Fax

## 2018-04-26 NOTE — TELEPHONE ENCOUNTER
Called patient, no answer, left message to have her return call to [phone]. There are no Corlanor samples available.

## 2018-05-16 ENCOUNTER — OFFICE VISIT (OUTPATIENT)
Dept: CARDIOLOGY CLINIC | Age: 28
End: 2018-05-16

## 2018-05-16 DIAGNOSIS — R00.0 TACHYCARDIA: Primary | ICD-10-CM

## 2018-05-16 NOTE — PROGRESS NOTES
HISTORY OF PRESENTING ILLNESS      Sally Blankenship is a 33 y.o. female with history of CMT who has had episodes of extreme fatigue, lightheadedness, and dizziness that can occur with random activities including bending, sitting, and climbing stairs that were initially associated with HR's ranging from . She has been labeled with POTS in the past and has been on midodrine and florinef. She has also had episodes of syncope. Ivabradine was continued and metoprolol was lowered to 12.5 mg BID and ultimately discontinued due to bradycardia. She has also been intolerant of diltiazem, Rythmol and Flecainide. 30 day monitor demonstrated episodes of ST with rates as high as the 160s and revealed numerous episodes of lightheadedness, dizziness, and chest pain that were associated with normal SR, and at least one episode where her HR was in the 160s, asx. Overall, she has noticed a significant improvement in her HR control with the highest HR noted during normal exertion now being around the low 100's. Last visit, her Corlanor was decreased due to resting heart rates are in the mid 50's at rest with associated fatigue. She reports continued episodes of presyncope as well as one episode of syncope per month, but feels Corlanor has provided the most well controlled treatment response thus far. Last visit, we continued current medical therapy; however, since then insurance has denied coverage for her Corlanor. She is having syncope daily now and her symptoms of palpitations/tachycardia and dizziness have worsened.       ACTIVE PROBLEM LIST     Patient Active Problem List    Diagnosis Date Noted    Inappropriate sinus tachycardia 08/11/2016    Tachycardia 07/08/2015    Syncope and collapse 06/27/2012    Bipolar disorder, unspecified ([facility] 06/27/2012    Orthostatic hypotension 06/27/2012    UTI (urinary tract infection) 06/27/2012           PAST MEDICAL HISTORY     Past Medical History:   Diagnosis Date    Asthma  Orthostatic hypotension     Other ill-defined conditions(799.89)     neurologicaly induced CMT    Psychiatric disorder     bipolar           PAST SURGICAL HISTORY     Past Surgical History:   Procedure Laterality Date    HX ORTHOPAEDIC      bilat feet          ALLERGIES     Allergies   Allergen Reactions    Benadryl [Diphenhydramine Hcl] Other (comments)     hallucinations    Morphine Shortness of Breath    Percocet [Oxycodone-Acetaminophen] Itching          FAMILY HISTORY     Family History   Problem Relation Age of Onset    Cancer Mother     Alcohol abuse Maternal Aunt     Diabetes Maternal Grandmother     Hypertension Maternal Grandmother     Cancer Paternal Grandmother     Diabetes Paternal Grandmother     Alcohol abuse Paternal Grandfather     negative for cardiac disease       SOCIAL HISTORY     Social History     Social History    Marital status: SINGLE     Spouse name: N/A    Number of children: N/A    Years of education: N/A     Social History Main Topics    Smoking status: Never Smoker    Smokeless tobacco: Never Used    Alcohol use No    Drug use: No    Sexual activity: Yes     Partners: Male     Birth control/ protection: Inserts     Other Topics Concern    Not on file     Social History Narrative         MEDICATIONS     Current Outpatient Prescriptions   Medication Sig    ivabradine (CORLANOR) 5 mg tablet Take 1 Tab by mouth two (2) times daily (with meals).  fluticasone-vilanterol (BREO ELLIPTA) 100-25 mcg/dose inhaler Take 1 Puff by inhalation daily.  metoprolol succinate (TOPROL-XL) 25 mg XL tablet Take 1 Tab by mouth daily. Indications: to be taken in place of corlanor    beclomethasone (QVAR) 40 mcg/actuation aero Take 1 Puff by inhalation two (2) times a day.  VYVANSE 10 mg capsule TK 1 C PO ONCE D    montelukast (SINGULAIR) 10 mg tablet Take 10 mg by mouth daily.  Cetirizine (ZYRTEC) 10 mg cap Take  by mouth daily.     clomiPRAMINE (ANAFRANIL) 50 mg capsule Take 50 mg by mouth two (2) times a day.  PROAIR HFA 90 mcg/actuation inhaler INHALE 2 PUFFS PO Q 4 TO 6 H PRN AND 10-15 MINUTES BEFORE EXERCISE    ibuprofen (MOTRIN) 600 mg tablet Take 1 Tab by mouth every eight (8) hours as needed for Pain (take with food).  escitalopram oxalate (LEXAPRO) 20 mg tablet Take 20 mg by mouth daily.  clonazePAM (KLONOPIN) 0.5 mg tablet Take 0.5 mg by mouth four (4) times daily.  LAMOTRIGINE (LAMICTAL PO) Take 100 mg by mouth daily.  NORETHINDRONE (JOLIVETTE PO) Take 0.28 mg by mouth daily. No current facility-administered medications for this visit. I have reviewed the nurses notes, vitals, problem list, allergy list, medical history, family, social history and medications. REVIEW OF SYMPTOMS      General: Pt denies excessive weight gain or loss. Pt is able to conduct ADL's  HEENT: Denies blurred vision, headaches, hearing loss, epistaxis and difficulty swallowing. Respiratory: Denies cough, congestion, shortness of breath, BRANDON, wheezing or stridor. Cardiovascular: Denies precordial pain, palpitations, edema or PND  Gastrointestinal: Denies poor appetite, indigestion, abdominal pain or blood in stool  Genitourinary: Denies hematuria, dysuria, increased urinary frequency  Musculoskeletal: Denies joint pain or swelling from muscles or joints  Neurologic: Denies tremor, paresthesias, headache, or sensory motor disturbance  Psychiatric: Denies confusion, insomnia, depression  Integumentray: Denies rash, itching or ulcers. Hematologic: Denies easy bruising, bleeding       PHYSICAL EXAMINATION      There were no vitals filed for this visit. General: Well developed, in no acute distress. HEENT: No jaundice, oral mucosa moist, no oral ulcers  Neck: Supple, no stiffness, no lymphadenopathy, supple  Heart:  Normal S1/S2 negative S3 or S4.  Regular, no murmur, gallop or rub, no jugular venous distention  Respiratory: Clear bilaterally x 4, no wheezing or rales  Abdomen:   Soft, non-tender, bowel sounds are active.   Extremities:  No edema, normal cap refill, no cyanosis. Musculoskeletal: No clubbing, no deformities  Neuro: A&Ox3, speech clear, gait stable, cooperative, no focal neurologic deficits  Skin: Skin color is normal. No rashes or lesions. Non diaphoretic, moist.  Vascular: 2+ pulses symmetric in all extremities       DIAGNOSTIC DATA      EKG:        LABORATORY DATA      Lab Results   Component Value Date/Time    WBC 6.0 09/07/2017 05:08 PM    Hemoglobin (POC) 13.9 09/07/2017 05:10 PM    HGB 13.3 09/07/2017 05:08 PM    Hematocrit (POC) 41 09/07/2017 05:10 PM    HCT 41.5 09/07/2017 05:08 PM    PLATELET 655 11/43/5459 05:08 PM    MCV 91.0 09/07/2017 05:08 PM      Lab Results   Component Value Date/Time    Sodium 141 06/27/2012 07:50 PM    Potassium 4.2 06/27/2012 07:50 PM    Chloride 106 06/27/2012 07:50 PM    CO2 29 06/27/2012 07:50 PM    Anion gap 6 06/27/2012 07:50 PM    Glucose 83 06/27/2012 07:50 PM    BUN 7 06/27/2012 07:50 PM    Creatinine 0.8 06/27/2012 07:50 PM    BUN/Creatinine ratio 9 (L) 06/27/2012 07:50 PM    GFR est AA >60 06/27/2012 07:50 PM    GFR est non-AA >60 06/27/2012 07:50 PM    Calcium 9.2 06/27/2012 07:50 PM    Bilirubin, total 0.3 09/07/2017 05:08 PM    AST (SGOT) 13 (L) 09/07/2017 05:08 PM    Alk. phosphatase 83 09/07/2017 05:08 PM    Protein, total 7.5 09/07/2017 05:08 PM    Albumin 3.6 09/07/2017 05:08 PM    Globulin 3.9 09/07/2017 05:08 PM    A-G Ratio 0.9 (L) 09/07/2017 05:08 PM    ALT (SGPT) 27 09/07/2017 05:08 PM           ASSESSMENT      1. CMT  2. Dizziness and lightheadedness              A. Occuring randomly with bending, sitting, climbing stairs  3. POTS vs. IST              A. Narrow complex tachycardia consistent with ST on 30 day monitor  4. Fatigue  5. Syncope      PLAN          FOLLOW-UP       Thank you, No primary care provider on file.  for allowing me to participate in the care of this extraordinarily pleasant female. Please do not hesitate to contact me for further questions/concerns.      GUILHERME Mejia MD  Cardiac Electrophysiology / Cardiology    [address]  [address] [facility], [facility], [address], [address] [place] [address], [facility]  [phone] / [phone] Fax   [phone] / [phone] Fax

## 2018-07-31 ENCOUNTER — OFFICE VISIT (OUTPATIENT)
Dept: ENDOCRINOLOGY | Age: 28
End: 2018-07-31

## 2018-07-31 VITALS
SYSTOLIC BLOOD PRESSURE: 101 MMHG | WEIGHT: 192 LBS | HEIGHT: 60 IN | BODY MASS INDEX: 37.69 KG/M2 | DIASTOLIC BLOOD PRESSURE: 69 MMHG | HEART RATE: 111 BPM | TEMPERATURE: 97.3 F | RESPIRATION RATE: 14 BRPM

## 2018-07-31 DIAGNOSIS — E27.49 SECONDARY ADRENAL INSUFFICIENCY (HCC): Primary | ICD-10-CM

## 2018-07-31 PROBLEM — E66.01 SEVERE OBESITY (BMI 35.0-39.9): Status: ACTIVE | Noted: 2018-07-31

## 2018-07-31 RX ORDER — HYDROCORTISONE 10 MG/1
10 TABLET ORAL DAILY
COMMUNITY
End: 2018-08-10 | Stop reason: SDUPTHER

## 2018-07-31 RX ORDER — FLUTICASONE PROPIONATE 50 MCG
2 SPRAY, SUSPENSION (ML) NASAL DAILY
COMMUNITY

## 2018-07-31 RX ORDER — QUETIAPINE FUMARATE 25 MG/1
25 TABLET, FILM COATED ORAL
COMMUNITY
End: 2018-09-07

## 2018-07-31 RX ORDER — OLOPATADINE HYDROCHLORIDE 2 MG/ML
1 SOLUTION/ DROPS OPHTHALMIC
COMMUNITY

## 2018-07-31 RX ORDER — TOPIRAMATE 50 MG/1
50 TABLET, FILM COATED ORAL 3 TIMES DAILY
Status: ON HOLD | COMMUNITY
End: 2019-06-20

## 2018-07-31 RX ORDER — HYDROXYZINE 25 MG/1
50 TABLET, FILM COATED ORAL
COMMUNITY

## 2018-07-31 NOTE — PATIENT INSTRUCTIONS
ADRENAL INSUFFICIENCY INSTRUCTIONS     In times of fever or infection: please double your dose of hydrocortisone or prednisone for 3 days . If you ever have surgery, please let any surgeon know that you have adrenal insufficiency and may need stress dose steroids depending on the surgery. If you have severe vomiting and cannot keep anything down please proceed to emergency room. Obtain and wear medic-alert bracelet at all times.

## 2018-07-31 NOTE — PROGRESS NOTES
Daren Jasso is a 33 y.o. female here for   Chief Complaint   Patient presents with    New Patient     possible adrenal crisis       1. Have you been to the ER, urgent care clinic since your last visit? Hospitalized since your last visit? -n/a    2. Have you seen or consulted any other health care providers outside of the [address] [place] [address] since your last visit?   Include any pap smears or colon screening.-n/a

## 2018-08-02 PROBLEM — E27.49 SECONDARY ADRENAL INSUFFICIENCY (HCC): Status: ACTIVE | Noted: 2018-08-02

## 2018-08-02 NOTE — PROGRESS NOTES
[facility]               Isabela Edward MD        [address] [phone] Fax [phone]       Patient Information  Date:8/2/2018  Name : Geneva Ruff 33 y.o.     YOB: 1990         Referred by: No primary care provider on file. Chief Complaint   Patient presents with   [facility] Patient     possible adrenal crisis       History of Present Illness: Geneva Ruff is a 33 y.o. female   She has history of Charcot-Yesi-Tooth disease, presented to [facility] with severe fatigue, weakness, dizziness, paresis was unable to move for several days after that. She has had these symptoms going on since age 22 years, has multiple episodes of passing out, no symptoms of dizziness before passing out, has passed out even in lying down position, has difficulty breathing and sometimes difficulty talking for several days after the episodes. Neurology was consulted, her neurologist is at [facility]. She had cortisol, ACTH drawn which were undetectable, inappropriate response to ACTH stimulation test .  Denies any recent exogenous glucocorticoids, the last dose was 5 years ago for asthma. She is however on inhaled glucocorticoids for several years. No prior history of adrenal insufficiency. Denies any nausea, vomiting, abdominal pain, weight loss, decreased appetite. She was started on hydrocortisone 10 mg/5 mg and was sent to rehab. She continues to have weakness and has inability to move, using walker.   Fatigue has improved  Had MRI brain: No pituitary tumor, non-pituitary protocol  Prolactin, TSH, free T4, IGF-I, FSH was normal.  No regular menstrual cycles  She was seen by  in the hospital, given neurological symptoms/neuromuscular symptoms was evaluated for adrenal myeloleukodystrophy, VLFDC were normal  Reportedly had the gene testing also for ABCD1    She is also being followed by electrophysiologist Dr. rodriguez, denies orthostasis, No history of POTs          Past Medical History:   Diagnosis Date    Adrenal insufficiency (HCC)     Asthma     Charcot-Yesi-Tooth disease     Orthostatic hypotension     Other ill-defined conditions(909.89)     neurologicaly induced CMT    Psychiatric disorder     bipolar     Past Surgical History:   Procedure Laterality Date    HX ORTHOPAEDIC      bilat feet     Current Outpatient Prescriptions   Medication Sig    QUEtiapine (SEROQUEL) 25 mg tablet Take 25 mg by mouth three (3) times daily as needed.  topiramate (TOPAMAX) 50 mg tablet Take 50 mg by mouth three (3) times daily.  hydrOXYzine HCl (ATARAX) 25 mg tablet Take 25 mg by mouth nightly.  olopatadine (PATADAY) 0.2 % drop ophthalmic solution Administer 1 Drop to both eyes as needed.  fluticasone (FLONASE) 50 mcg/actuation nasal spray 2 Sprays by Both Nostrils route daily.  hydrocortisone (CORTEF) 10 mg tablet Take 10 mg by mouth daily. Take 1 tab and then 0.5 tab daily.  fluticasone-vilanterol (BREO ELLIPTA) 100-25 mcg/dose inhaler Take 1 Puff by inhalation daily.  VYVANSE 10 mg capsule 20 mg once daily    montelukast (SINGULAIR) 10 mg tablet Take 10 mg by mouth daily.  Cetirizine (ZYRTEC) 10 mg cap Take 10 mg by mouth daily.  clomiPRAMINE (ANAFRANIL) 50 mg capsule Take 50 mg by mouth nightly.  escitalopram oxalate (LEXAPRO) 20 mg tablet Take 20 mg by mouth daily.  clonazePAM (KLONOPIN) 0.5 mg tablet Take 0.5 mg by mouth three (3) times daily as needed.  LAMOTRIGINE (LAMICTAL PO) Take 100 mg by mouth every other day.  NORETHINDRONE (JOLIVETTE PO) Take 0.35 mg by mouth daily.  ivabradine (CORLANOR) 5 mg tablet Take 1 Tab by mouth two (2) times daily (with meals).  metoprolol succinate (TOPROL-XL) 25 mg XL tablet Take 1 Tab by mouth daily. Indications: to be taken in place of corlanor    beclomethasone (QVAR) 40 mcg/actuation aero Take 1 Puff by inhalation two (2) times a day.    [address] 90 mcg/actuation inhaler INHALE 2 PUFFS PO Q 4 TO 6 H PRN AND 10-15 MINUTES BEFORE EXERCISE    ibuprofen (MOTRIN) 600 mg tablet Take 1 Tab by mouth every eight (8) hours as needed for Pain (take with food). No current facility-administered medications for this visit. Allergies   Allergen Reactions    Benadryl [Diphenhydramine Hcl] Other (comments)     hallucinations    Morphine Shortness of Breath    Percocet [Oxycodone-Acetaminophen] Itching         Review of Systems:  All 10 systems reviewed and are negative other than mentioned in HPI    Physical Examination:  Blood pressure 101/69, pulse (!) 111, temperature 97.3 °F (36.3 °C), temperature source Oral, resp. rate 14, height 5' (1.524 m), weight 192 lb (87.1 kg). Body mass index is 37.5 kg/(m^2). - General: pleasant, no distress, good eye contact  - HEENT: no exopthalmos, no periorbital edema, no scleral/conjunctival injection,no lid lag or stare  - Neck: supple, no thyromegaly, lymph nodes  - Cardiovascular: regular, tachycardic, normal S1 and S2, no murmurs  - Respiratory: clear to auscultation bilaterally  - Gastrointestinal: soft, nontender, nondistended,BS +  - Integumentary:  no rashes,  Musculoskeletal: No edema, generalized weakness  - Neurological: Alert and oriented, no tremor  - Psychiatric: normal mood and affect    Data Reviewed:     Assessment/Plan:     1. Secondary adrenal insufficiency (HCC)        Secondary adrenal insufficiency, rest of the pituitary hormones normal.  Non-pituitary protocol MRI negative  ESR was elevated  There are reports of adrenal insufficiency due to chronic inhaled glucocorticoids. Other possibility is hypophysitis:   She has more neuromuscular symptoms, not typical of adrenal insufficiency. She continues to have muscular weakness. Workup for myasthenia gravis, paraneoplastic syndrome was negative.   If She continues to have neuromuscular weakness need to discuss with neurology again   primary adrenal insufficiency is associated with  AMN , ALD not secondary AI. Thank you for allowing me to participate in the care of this patient. Kristopher Key MD      Follow-up Disposition:  Return in about 2 months (around 9/30/2018). Patient /caregiver verbalized understanding . Voice-recognition software was used to generate this report, which may result in some phonetic-based errors in the grammar and contents. Even though attempts were made to correct all the mistakes, some may have been missed and remained in the body of the report.

## 2018-08-10 DIAGNOSIS — E27.49 SECONDARY ADRENAL INSUFFICIENCY (HCC): Primary | ICD-10-CM

## 2018-08-10 RX ORDER — HYDROCORTISONE 10 MG/1
TABLET ORAL
Qty: 45 TAB | Refills: 11 | Status: ON HOLD | OUTPATIENT
Start: 2018-08-10 | End: 2019-06-20

## 2018-08-10 NOTE — TELEPHONE ENCOUNTER
----- Message from Juan C Coe sent at 8/10/2018  1:02 PM EDT -----  Regarding: Dr. Mike Hammond   Pt called in regards to a prescription she never received for Hydrocortisone. Pt stated she put in a message on 08/08 but have not yet received a call. Pt best contact number is [phone].

## 2018-08-10 NOTE — TELEPHONE ENCOUNTER
----- Message from Mateo Chacon sent at 8/9/2018  1:07 PM EDT -----  Regarding: Dr. Hyacinth Choudhury  The patient is requesting a call back to confirm if she needs to have blood work done before her appointment on 9/11/18.  [phone]

## 2018-08-13 NOTE — TELEPHONE ENCOUNTER
Order placed for pt per verbal order with read back from Dr. Elaine Olivares 08/13/18    Lab slips mailed.

## 2018-09-06 LAB
ACTH PLAS-MCNC: 5.2 PG/ML (ref 7.2–63.3)
CORTIS AM PEAK SERPL-MCNC: 0.3 UG/DL (ref 6.2–19.4)
PROLACTIN SERPL-MCNC: 12.1 NG/ML (ref 4.8–23.3)
T4 FREE SERPL-MCNC: 1.03 NG/DL (ref 0.82–1.77)
TSH SERPL DL<=0.005 MIU/L-ACNC: 2.62 UIU/ML (ref 0.45–4.5)

## 2018-09-07 ENCOUNTER — OFFICE VISIT (OUTPATIENT)
Dept: CARDIOLOGY CLINIC | Age: 28
End: 2018-09-07

## 2018-09-07 VITALS
WEIGHT: 190.6 LBS | OXYGEN SATURATION: 98 % | RESPIRATION RATE: 18 BRPM | HEIGHT: 60 IN | SYSTOLIC BLOOD PRESSURE: 100 MMHG | BODY MASS INDEX: 37.42 KG/M2 | HEART RATE: 100 BPM | DIASTOLIC BLOOD PRESSURE: 68 MMHG

## 2018-09-07 DIAGNOSIS — R00.0 TACHYCARDIA: Primary | ICD-10-CM

## 2018-09-07 RX ORDER — DILTIAZEM HYDROCHLORIDE 120 MG/1
CAPSULE, COATED, EXTENDED RELEASE ORAL
Qty: 90 CAP | Refills: 1 | Status: ON HOLD | OUTPATIENT
Start: 2018-09-07 | End: 2019-06-20

## 2018-09-07 RX ORDER — ZINC GLUCONATE 10 MG
250 LOZENGE ORAL
Status: ON HOLD | COMMUNITY
End: 2019-07-09

## 2018-09-07 RX ORDER — IBUPROFEN 800 MG/1
TABLET ORAL
Refills: 1 | Status: ON HOLD | COMMUNITY
Start: 2018-09-05 | End: 2019-06-20

## 2018-09-07 RX ORDER — FLUTICASONE FUROATE AND VILANTEROL TRIFENATATE 200; 25 UG/1; UG/1
1 POWDER RESPIRATORY (INHALATION) DAILY
Status: ON HOLD | COMMUNITY
Start: 2018-06-25 | End: 2019-06-20 | Stop reason: ALTCHOICE

## 2018-09-07 RX ORDER — ERGOCALCIFEROL 1.25 MG/1
50000 CAPSULE ORAL
COMMUNITY
End: 2019-01-30

## 2018-09-07 NOTE — PROGRESS NOTES
HISTORY OF PRESENTING ILLNESS      Jerzy Alfred is a 33 y.o. female with CMT, IST/POTS presenting after being diagnosed with secondary adrenal insufficiency recently. She has overall noticed acceptable HR's on her Fitbit however has episodes where her HR elevates and results in fatigue. These episodes are usually being driven by pain, etc however she notices her HR is slow in de-escalating.         ACTIVE PROBLEM LIST     Patient Active Problem List    Diagnosis Date Noted    Secondary adrenal insufficiency ([address] 08/02/2018    Severe obesity (BMI 35.0-39.9) ([address] 07/31/2018    Inappropriate sinus tachycardia 08/11/2016    Tachycardia 07/08/2015    Syncope and collapse 06/27/2012    Bipolar disorder, unspecified ([address] 06/27/2012    Orthostatic hypotension 06/27/2012    UTI (urinary tract infection) 06/27/2012           PAST MEDICAL HISTORY     Past Medical History:   Diagnosis Date    Adrenal insufficiency ([address]     Asthma     Charcot-Yesi-Tooth disease     Orthostatic hypotension     Other ill-defined conditions(799.89)     neurologicaly induced CMT    Psychiatric disorder     bipolar           PAST SURGICAL HISTORY     Past Surgical History:   Procedure Laterality Date    HX ORTHOPAEDIC      bilat feet          ALLERGIES     Allergies   Allergen Reactions    Benadryl [Diphenhydramine Hcl] Other (comments)     hallucinations    Morphine Shortness of Breath    Percocet [Oxycodone-Acetaminophen] Itching          FAMILY HISTORY     Family History   Problem Relation Age of Onset    Cancer Mother     Alcohol abuse Maternal Aunt     Diabetes Maternal Grandmother     Hypertension Maternal Grandmother     Cancer Paternal Grandmother     Diabetes Paternal Grandmother     Alcohol abuse Paternal Grandfather     negative for cardiac disease       SOCIAL HISTORY     Social History     Social History    Marital status: SINGLE     Spouse name: N/A    Number of children: N/A    Years of education: N/A     Social History Main Topics    Smoking status: Never Smoker    Smokeless tobacco: Never Used    Alcohol use No    Drug use: No    Sexual activity: Yes     Partners: Male     Birth control/ protection: Inserts     Other Topics Concern    None     Social History Narrative         MEDICATIONS     Current Outpatient Prescriptions   Medication Sig    ibuprofen (MOTRIN) 800 mg tablet TK 1 T PO BID PRN    BREO ELLIPTA 200-25 mcg/dose inhaler Take 1 Puff by inhalation daily.  magnesium 250 mg tab Take  by mouth daily.  ergocalciferol (VITAMIN D2) 50,000 unit capsule Take 50,000 Units by mouth every seven (7) days.  hydrocortisone (CORTEF) 10 mg tablet 1 tablet PO in AM and 1/2 tablet 5 PM    topiramate (TOPAMAX) 50 mg tablet Take 50 mg by mouth three (3) times daily.  hydrOXYzine HCl (ATARAX) 25 mg tablet Take 25 mg by mouth nightly.  olopatadine (PATADAY) 0.2 % drop ophthalmic solution Administer 1 Drop to both eyes as needed.  fluticasone (FLONASE) 50 mcg/actuation nasal spray 2 Sprays by Both Nostrils route daily.  VYVANSE 10 mg capsule 20 mg once daily    montelukast (SINGULAIR) 10 mg tablet Take 10 mg by mouth daily.  Cetirizine (ZYRTEC) 10 mg cap Take 10 mg by mouth daily.  clomiPRAMINE (ANAFRANIL) 50 mg capsule Take 50 mg by mouth nightly.  PROAIR HFA 90 mcg/actuation inhaler INHALE 2 PUFFS PO Q 4 TO 6 H PRN AND 10-15 MINUTES BEFORE EXERCISE    escitalopram oxalate (LEXAPRO) 20 mg tablet Take 20 mg by mouth daily.  clonazePAM (KLONOPIN) 0.5 mg tablet Take 0.5 mg by mouth three (3) times daily as needed.  LAMOTRIGINE (LAMICTAL PO) Take 100 mg by mouth every other day.  NORETHINDRONE (JOLIVETTE PO) Take 0.35 mg by mouth daily. No current facility-administered medications for this visit. I have reviewed the nurses notes, vitals, problem list, allergy list, medical history, family, social history and medications.        REVIEW OF SYMPTOMS General: Pt denies excessive weight gain or loss. Pt is able to conduct ADL's  HEENT: Denies blurred vision, headaches, hearing loss, epistaxis and difficulty swallowing. Respiratory: Denies cough, congestion, shortness of breath, BRANDON, wheezing or stridor. Cardiovascular: Denies precordial pain, palpitations, edema or PND  Gastrointestinal: Denies poor appetite, indigestion, abdominal pain or blood in stool  Genitourinary: Denies hematuria, dysuria, increased urinary frequency  Musculoskeletal.: Denies joint pain or swelling from muscles or joints  Neurologic: Denies tremor, paresthesias, headache, or sensory motor disturbance  Psychiatric: Denies confusion, insomnia, depression  Integumentray: Denies rash, itching or ulcers. Hematologic: Denies easy bruising, bleeding       PHYSICAL EXAMINATION      Vitals:    09/07/18 1344   BP: 100/68   Pulse: 100   Resp: 18   SpO2: 98%   Weight: 190 lb 9.6 oz (86.5 kg)   Height: 5' (1.524 m)     General: Well developed, in no acute distress. HEENT: No jaundice, oral mucosa moist, no oral ulcers  Neck: Supple, no stiffness, no lymphadenopathy, supple  Heart:  Normal S1/S2 negative S3 or S4. Regular, no murmur, gallop or rub, no jugular venous distention  Respiratory: Clear bilaterally x 4, no wheezing or rales  Abdomen:   Soft, non-tender, bowel sounds are active.   Extremities:  No edema, normal cap refill, no cyanosis. Musculoskeletal: No clubbing, no deformities  Neuro: A&Ox3, speech clear, gait stable, cooperative, no focal neurologic deficits  Skin: Skin color is normal. No rashes or lesions.  Non diaphoretic, moist.  Vascular: 2+ pulses symmetric in all extremities       DIAGNOSTIC DATA      EKG: Sinus rhythm at 100 bpm       LABORATORY DATA      Lab Results   Component Value Date/Time    WBC 6.0 09/07/2017 05:08 PM    Hemoglobin (POC) 13.9 09/07/2017 05:10 PM    HGB 13.3 09/07/2017 05:08 PM    Hematocrit (POC) 41 09/07/2017 05:10 PM    HCT 41.5 09/07/2017 05:08 PM PLATELET 401 64/99/9252 05:08 PM    MCV 91.0 09/07/2017 05:08 PM      Lab Results   Component Value Date/Time    Sodium 141 06/27/2012 07:50 PM    Potassium 4.2 06/27/2012 07:50 PM    Chloride 106 06/27/2012 07:50 PM    CO2 29 06/27/2012 07:50 PM    Anion gap 6 06/27/2012 07:50 PM    Glucose 83 06/27/2012 07:50 PM    BUN 7 06/27/2012 07:50 PM    Creatinine 0.8 06/27/2012 07:50 PM    BUN/Creatinine ratio 9 (L) 06/27/2012 07:50 PM    GFR est AA >60 06/27/2012 07:50 PM    GFR est non-AA >60 06/27/2012 07:50 PM    Calcium 9.2 06/27/2012 07:50 PM    Bilirubin, total 0.3 09/07/2017 05:08 PM    AST (SGOT) 13 (L) 09/07/2017 05:08 PM    Alk. phosphatase 83 09/07/2017 05:08 PM    Protein, total 7.5 09/07/2017 05:08 PM    Albumin 3.6 09/07/2017 05:08 PM    Globulin 3.9 09/07/2017 05:08 PM    A-G Ratio 0.9 (L) 09/07/2017 05:08 PM    ALT (SGPT) 27 09/07/2017 05:08 PM           ASSESSMENT      1. CMT  2. Dizziness and lightheadedness              A. Occuring randomly with bending, sitting, climbing stairs  3. POTS vs. IST              A. Narrow complex tachycardia consistent with ST on 30 day monitor  4. Fatigue  5. Syncope        PLAN     Trial of diltiazem 120 mg daily PRN for episodes of tachycardia.  Will monitor for bradycardia       FOLLOW-UP     1 year        Eric France MD  Cardiac Electrophysiology / Cardiology    [address], [place] [facility], [address] [facility]  [address]    [address] [facility] [place]  [phone] / [phone] Fax   [phone] / [phone] Fax

## 2018-09-07 NOTE — PATIENT INSTRUCTIONS
Treatment Plan:  Diltiazem 120mg as needed maximum once per day for fast heart rates. Follow up in 1 year. Contact our office with any concerns.

## 2018-09-07 NOTE — MR AVS SNAPSHOT
[address] [facility] [address] 
[phone] Patient: Jaquan Hines MRN: [id number] ZNX:9/13/6563 Visit Information Date & Time Provider Department Dept. Phone Encounter #  
 9/7/2018  1:40 PM Montez De La Cruz MD [facility] [phone] [id number] Your Appointments 9/11/2018  3:45 PM  
ROUTINE CARE with Deepthi Shafer MD  
[facility]) [address] [place] [address] G 5401 [facility] [address]  
[phone]  
  
   
 [address] [address]  
  
    
 9/11/2018  3:45 PM  
ROUTINE CARE with Toi Cohen MD  
[facility]) Appt Note: 6 week fu; rs from 9/11  
 [address] [facility] [address]  
[phone]  
  
   
 [address] [place] [address] 9/13/2019  2:00 PM  
ESTABLISHED PATIENT with Montez De La Cruz MD  
[facility]) Appt Note: annual  
 [address] [place] [id number]  
[address] [facility] [address] Upcoming Health Maintenance Date Due DTaP/Tdap/Td series (1 - Tdap) 6/18/2012 PAP AKA CERVICAL CYTOLOGY 6/18/2012 Influenza Age 5 to Adult 8/1/2018 Allergies as of 9/7/2018  Review Complete On: 9/7/2018 By: Montez De La Cruz MD  
  
 Severity Noted Reaction Type Reactions Benadryl [Diphenhydramine Hcl]  04/24/2012   Side Effect Other (comments)  
 hallucinations Morphine  04/24/2012   Systemic Shortness of Breath Percocet [Oxycodone-acetaminophen]  12/27/2017    Itching Current Immunizations  Never Reviewed No immunizations on file. Not reviewed this visit You Were Diagnosed With   
  
 Codes Comments Tachycardia    -  Primary ICD-10-CM: R00.0 ICD-9-CM: 785.0 Vitals BP Pulse Resp Height(growth percentile) Weight(growth percentile) SpO2  
 100/68 (BP 1 Location: Left arm, BP Patient Position: Sitting) 100 18 5' (1.524 m) 190 lb 9.6 oz (86.5 kg) 98% BMI OB Status Smoking Status 37.22 kg/m2 Having regular periods Never Smoker Vitals History BMI and BSA Data Body Mass Index Body Surface Area  
 37.22 kg/m 2 1.91 m 2 Preferred Pharmacy Pharmacy Name Phone [facility] [address] [facility] [phone] Your Updated Medication List  
  
   
This list is accurate as of 9/7/18  2:29 PM.  Always use your most recent med list.  
  
  
  
  
 ANAFRANIL 50 mg capsule Generic drug:  clomiPRAMINE Take 50 mg by mouth nightly. BREO ELLIPTA 200-25 mcg/dose inhaler Generic drug:  fluticasone-vilanterol Take 1 Puff by inhalation daily. clonazePAM 0.5 mg tablet Commonly known as:  Ophelia Rik Take 0.5 mg by mouth three (3) times daily as needed. escitalopram oxalate 20 mg tablet Commonly known as:  Mariia Casey Take 20 mg by mouth daily. fluticasone 50 mcg/actuation nasal spray Commonly known as:  Domnick Means 2 Sprays by Both Nostrils route daily. hydrocortisone 10 mg tablet Commonly known as:  CORTEF  
1 tablet PO in AM and 1/2 tablet 5 PM  
  
 hydrOXYzine HCl 25 mg tablet Commonly known as:  ATARAX Take 25 mg by mouth nightly. ibuprofen 800 mg tablet Commonly known as:  MOTRIN  
TK 1 T PO BID PRN  
  
 JOLIVETTE PO Take 0.35 mg by mouth daily. LAMICTAL PO Take 100 mg by mouth every other day. magnesium 250 mg Tab Take  by mouth daily. olopatadine 0.2 % Drop ophthalmic solution Commonly known as:  PATADAY Administer 1 Drop to both eyes as needed. PROAIR HFA 90 mcg/actuation inhaler Generic drug:  albuterol INHALE 2 PUFFS PO Q 4 TO 6 H PRN AND 10-15 MINUTES BEFORE EXERCISE  
  
 SINGULAIR 10 mg tablet Generic drug:  montelukast  
Take 10 mg by mouth daily. topiramate 50 mg tablet Commonly known as:  TOPAMAX Take 50 mg by mouth three (3) times daily. VITAMIN D2 50,000 unit capsule Generic drug:  ergocalciferol Take 50,000 Units by mouth every seven (7) days. VYVANSE 10 mg capsule Generic drug:  lisdexamfetamine 20 mg once daily ZyrTEC 10 mg Cap Generic drug:  Cetirizine Take 10 mg by mouth daily. We Performed the Following AMB POC EKG ROUTINE W/ 12 LEADS, INTER & REP [55413 CPT(R)] Patient Instructions Treatment Plan: 
Diltiazem 120mg as needed maximum once per day for fast heart rates. Follow up in 1 year. Contact our office with any concerns. Introducing [facility]! Dear Steve Sanchez: Thank you for requesting a [employer] account. Our records indicate that you already have an active [employer] account. You can access your account anytime at [email or web address] Did you know that you can access your hospital and ER discharge instructions at any time in [employer]? You can also review all of your test results from your hospital stay or ER visit. Additional Information If you have questions, please visit the Frequently Asked Questions section of the [employer] website at [email or web address] Remember, [employer] is NOT to be used for urgent needs. For medical emergencies, dial 911. Now available from your iPhone and Android! Please provide this summary of care documentation to your next provider. If you have any questions after today's visit, please call [phone].

## 2018-09-07 NOTE — PROGRESS NOTES
Visit Vitals    /68 (BP 1 Location: Left arm, BP Patient Position: Sitting)    Pulse 100    Resp 18    Ht 5' (1.524 m)    Wt 190 lb 9.6 oz (86.5 kg)    SpO2 98%    BMI 37.22 kg/m2     Medication changes made  per verbal order of Dr. Eric France

## 2018-09-11 ENCOUNTER — OFFICE VISIT (OUTPATIENT)
Dept: ENDOCRINOLOGY | Age: 28
End: 2018-09-11

## 2018-09-11 VITALS
HEIGHT: 60 IN | TEMPERATURE: 97.7 F | WEIGHT: 190.8 LBS | BODY MASS INDEX: 37.46 KG/M2 | RESPIRATION RATE: 14 BRPM | OXYGEN SATURATION: 98 % | HEART RATE: 109 BPM | DIASTOLIC BLOOD PRESSURE: 75 MMHG | SYSTOLIC BLOOD PRESSURE: 116 MMHG

## 2018-09-11 DIAGNOSIS — E27.49 SECONDARY ADRENAL INSUFFICIENCY (HCC): Primary | ICD-10-CM

## 2018-09-11 DIAGNOSIS — G60.0 CMT (CHARCOT-MARIE-TOOTH DISEASE): ICD-10-CM

## 2018-09-11 NOTE — PROGRESS NOTES
[facility]               Radha Carias MD        [address] 78 [phone] Fax [phone]       Patient Information  Date:9/15/2018  Name : Jerzy Alfred 33 y.o.     YOB: 1990         Referred by: No primary care provider on file. Chief Complaint   Patient presents with    Adrenal Insufficiency       History of Present Illness: Jerzy Alfred is a 33 y.o. female here for follow-up  She has history of Charcot-Yesi-Tooth disease, presented to [facility] with severe fatigue, weakness, dizziness, paresis was unable to move for several days after that. She has had these symptoms going on since age 22 years, has multiple episodes of passing out, no symptoms of dizziness before passing out, has passed out even in lying down position, has difficulty breathing and sometimes difficulty talking for several days after the episodes. Neurology was consulted, her neurologist is at [address] [place] [address]. She had cortisol, ACTH drawn which were undetectable, inappropriate response to ACTH stimulation test .  Denies any recent exogenous glucocorticoids, the last dose was 5 years ago for asthma. She is however on inhaled glucocorticoids for several years. No prior history of adrenal insufficiency. Denies any nausea, vomiting, abdominal pain, weight loss, decreased appetite. She was started on hydrocortisone 10 mg/5 mg and was sent to rehab. She continues to have weakness and has inability to move, using walker.   Fatigue has improved  Had MRI brain: No pituitary tumor, non-pituitary protocol  Prolactin, TSH, free T4, IGF-I, FSH was normal.  She was seen by  in the hospital, given neurological symptoms/neuromuscular symptoms was evaluated for adrenal myeloleukodystrophy, VLFDC were normal  Reportedly had the gene testing also for ABCD1 according to the patient, discussed with  -no gene testing was done    She is also being followed by electrophysiologist Dr. rodriguez, denies orthostasis,   No history of POTs    She has been taking the hydrocortisone consistently, has noted some improvement in her muscle weakness. No dizziness or syncope  The days she stopped the hydrocortisone for the testing felt extremely weak            Past Medical History:   Diagnosis Date    Adrenal insufficiency (HCC)     Asthma     Charcot-Yesi-Tooth disease     Orthostatic hypotension     Other ill-defined conditions(799.89)     neurologicaly induced CMT    Psychiatric disorder     bipolar     Past Surgical History:   Procedure Laterality Date    HX ORTHOPAEDIC      bilat feet     Current Outpatient Prescriptions   Medication Sig    hydrocortisone sodium succ/PF (SOLU-CORTEF, PF,) 100 mg/2 mL solr (Act-O-Vial) 100 mg intramuscular once in the emergency    ibuprofen (MOTRIN) 800 mg tablet TK 1 T PO BID PRN    BREO ELLIPTA 200-25 mcg/dose inhaler Take 1 Puff by inhalation daily.  magnesium 250 mg tab Take  by mouth daily.  ergocalciferol (VITAMIN D2) 50,000 unit capsule Take 50,000 Units by mouth every seven (7) days.  dilTIAZem CD (CARDIZEM CD) 120 mg ER capsule Take 1 tablet per day if needed for fast heart rate. Maximum dosage - 1 capsule every 24 hours.  hydrocortisone (CORTEF) 10 mg tablet 1 tablet PO in AM and 1/2 tablet 5 PM    topiramate (TOPAMAX) 50 mg tablet Take 50 mg by mouth three (3) times daily.  hydrOXYzine HCl (ATARAX) 25 mg tablet Take 25 mg by mouth nightly.  olopatadine (PATADAY) 0.2 % drop ophthalmic solution Administer 1 Drop to both eyes as needed.  fluticasone (FLONASE) 50 mcg/actuation nasal spray 2 Sprays by Both Nostrils route daily.  VYVANSE 10 mg capsule 20 mg once daily    montelukast (SINGULAIR) 10 mg tablet Take 10 mg by mouth daily.  Cetirizine (ZYRTEC) 10 mg cap Take 10 mg by mouth daily.  clomiPRAMINE (ANAFRANIL) 50 mg capsule Take 50 mg by mouth nightly.     PROAIR HFA 90 mcg/actuation inhaler INHALE 2 PUFFS PO Q 4 TO 6 H PRN AND 10-15 MINUTES BEFORE EXERCISE    escitalopram oxalate (LEXAPRO) 20 mg tablet Take 20 mg by mouth daily.  clonazePAM (KLONOPIN) 0.5 mg tablet Take 0.5 mg by mouth three (3) times daily as needed.  LAMOTRIGINE (LAMICTAL PO) Take 100 mg by mouth every other day.  NORETHINDRONE (JOLIVETTE PO) Take 0.35 mg by mouth daily. No current facility-administered medications for this visit. Allergies   Allergen Reactions    Benadryl [Diphenhydramine Hcl] Other (comments)     hallucinations    Morphine Shortness of Breath    Percocet [Oxycodone-Acetaminophen] Itching         Review of Systems:  All 10 systems reviewed and are negative other than mentioned in HPI    Physical Examination:  Blood pressure 116/75, pulse (!) 109, temperature 97.7 °F (36.5 °C), temperature source Oral, resp. rate 14, height 5' (1.524 m), weight 190 lb 12.8 oz (86.5 kg), SpO2 98 %. Body mass index is 37.26 kg/(m^2). - General: pleasant, no distress, good eye contact  - HEENT: no exopthalmos, no periorbital edema, no scleral/conjunctival injection,no lid lag or stare  - Neck: supple, no thyromegaly, lymph nodes  - Cardiovascular: regular, tachycardic, normal S1 and S2, no murmurs  - Respiratory: clear to auscultation bilaterally  - Gastrointestinal: soft, nontender, nondistended,BS +  - Integumentary:  no rashes,  Musculoskeletal: No edema, generalized weakness  - Neurological: Alert and oriented, no tremor  - Psychiatric: normal mood and affect    Data Reviewed:     Assessment/Plan:     1. Secondary adrenal insufficiency ([address]    2. CMT (Charcot-Yesi-Tooth disease)        Secondary adrenal insufficiency, rest of the pituitary hormones normal.  Non-pituitary protocol MRI negative  ESR was elevated  There are reports of adrenal insufficiency due to chronic inhaled glucocorticoids. Other possibility is hypophysitis:   She had more neuromuscular symptoms which took a very long time to improve at least 6 weeks after starting the glucocorticoid replacement, not typical of adrenal insufficiency. Workup for myasthenia gravis, paraneoplastic syndrome was negative. primary adrenal insufficiency is associated with  AMN , ALD not secondary AI. Discussed about sick day rules  Medical alert bracelet  After holding hydrocortisone for 24 hours, cortisol and ACTH was still very low      Charcot-Yesi-Tooth disease: Followed by neurology      Thank you for allowing me to participate in the care of this patient. Jessica Kline MD      Follow-up Disposition:  Return in about 6 months (around 3/11/2019). Patient /caregiver verbalized understanding . Voice-recognition software was used to generate this report, which may result in some phonetic-based errors in the grammar and contents. Even though attempts were made to correct all the mistakes, some may have been missed and remained in the body of the report.

## 2018-09-11 NOTE — MR AVS SNAPSHOT
[address] [facility] [address] [place] [address] [phone] Patient: Oswald Little MRN: [id number] IIQ:[id number]/89/9244 Visit Information Date & Time Provider Department Dept. Phone Encounter #  
 9/11/2018  3:45 PM Radhika Huggins MD [facility] [phone] [id number] Your Appointments 9/13/2019  2:00 PM  
ESTABLISHED PATIENT with Joni Harrison MD  
[facility] [address] [place] [address]) Appt Note: annual  
 [address] [place] [address]  
[address] [facility] [address] Upcoming Health Maintenance Date Due DTaP/Tdap/Td series (1 - Tdap) 6/18/2012 PAP AKA CERVICAL CYTOLOGY 6/18/2012 Influenza Age 5 to Adult 8/1/2018 Allergies as of 9/11/2018  Review Complete On: 9/11/2018 By: Haley Porras LPN Severity Noted Reaction Type Reactions Benadryl [Diphenhydramine Hcl]  04/24/2012   Side Effect Other (comments)  
 hallucinations Morphine  04/24/2012   Systemic Shortness of Breath Percocet [Oxycodone-acetaminophen]  12/27/2017    Itching Current Immunizations  Never Reviewed No immunizations on file. Not reviewed this visit You Were Diagnosed With   
  
 Codes Comments Secondary adrenal insufficiency (HCC)    -  Primary ICD-10-CM: E27.49 
ICD-9-CM: 255.41 Vitals BP Pulse Temp Resp Height(growth percentile) Weight(growth percentile) 116/75 (BP 1 Location: Right arm, BP Patient Position: Sitting) (!) 109 97.7 °F (36.5 °C) (Oral) 14 5' (1.524 m) 190 lb 12.8 oz (86.5 kg) SpO2 BMI OB Status Smoking Status 98% 37.26 kg/m2 Having regular periods Never Smoker BMI and BSA Data Body Mass Index Body Surface Area  
 37.26 kg/m 2 1.91 m 2 Preferred Pharmacy Pharmacy Name Phone  [facility] [address] [facility] 18 [phone] Your Updated Medication List  
  
   
This list is accurate as of 9/11/18  4:21 PM.  Always use your most recent med list.  
  
  
  
  
 ANAFRANIL 50 mg capsule Generic drug:  clomiPRAMINE Take 50 mg by mouth nightly. BREO ELLIPTA 200-25 mcg/dose inhaler Generic drug:  fluticasone-vilanterol Take 1 Puff by inhalation daily. clonazePAM 0.5 mg tablet Commonly known as:  Karrin Reamer Take 0.5 mg by mouth three (3) times daily as needed. dilTIAZem  mg ER capsule Commonly known as:  CARDIZEM CD Take 1 tablet per day if needed for fast heart rate. Maximum dosage - 1 capsule every 24 hours. escitalopram oxalate 20 mg tablet Commonly known as:  Nubia Basket Take 20 mg by mouth daily. fluticasone 50 mcg/actuation nasal spray Commonly known as:  Knox Williams 2 Sprays by Both Nostrils route daily. hydrocortisone 10 mg tablet Commonly known as:  CORTEF  
1 tablet PO in AM and 1/2 tablet 5 PM  
  
 hydrOXYzine HCl 25 mg tablet Commonly known as:  ATARAX Take 25 mg by mouth nightly. ibuprofen 800 mg tablet Commonly known as:  MOTRIN  
TK 1 T PO BID PRN  
  
 JOLIVETTE PO Take 0.35 mg by mouth daily. LAMICTAL PO Take 100 mg by mouth every other day. magnesium 250 mg Tab Take  by mouth daily. olopatadine 0.2 % Drop ophthalmic solution Commonly known as:  PATADAVARGAS Administer 1 Drop to both eyes as needed. PROAIR HFA 90 mcg/actuation inhaler Generic drug:  albuterol INHALE 2 PUFFS PO Q 4 TO 6 H PRN AND 10-15 MINUTES BEFORE EXERCISE  
  
 SINGULAIR 10 mg tablet Generic drug:  montelukast  
Take 10 mg by mouth daily. topiramate 50 mg tablet Commonly known as:  TOPAMAX Take 50 mg by mouth three (3) times daily. VITAMIN D2 50,000 unit capsule Generic drug:  ergocalciferol Take 50,000 Units by mouth every seven (7) days.   
  
 VYVANSE 10 mg capsule Generic drug:  lisdexamfetamine 20 mg once daily ZyrTEC 10 mg Cap Generic drug:  Cetirizine Take 10 mg by mouth daily. Introducing [facility]! Dear Marianne Lew: Thank you for requesting a [employer] account. Our records indicate that you already have an active [employer] account. You can access your account anytime at [email or web address] Did you know that you can access your hospital and ER discharge instructions at any time in [employer]? You can also review all of your test results from your hospital stay or ER visit. Additional Information If you have questions, please visit the Frequently Asked Questions section of the [employer] website at [email or web address] Remember, [employer] is NOT to be used for urgent needs. For medical emergencies, dial 911. Now available from your iPhone and Android! Please provide this summary of care documentation to your next provider. If you have any questions after today's visit, please call [phone].

## 2018-09-11 NOTE — PROGRESS NOTES
Oswald Little is a 33 y.o. female here for   Chief Complaint   Patient presents with    Adrenal Insufficiency       1. Have you been to the ER, urgent care clinic since your last visit? Hospitalized since your last visit? -    2. Have you seen or consulted any other health care providers outside of the [address] [place] [address] since your last visit?   Include any pap smears or colon screening.-    Order placed for pt,  per Verbal Order with read back from Dr Smith Almeida 9/11/2018

## 2018-09-13 RX ORDER — HYDROCORTISONE SODIUM SUCCINATE 100 MG/2ML
INJECTION, POWDER, FOR SOLUTION INTRAMUSCULAR; INTRAVENOUS
Qty: 1 EACH | Refills: 2 | Status: ON HOLD | OUTPATIENT
Start: 2018-09-13 | End: 2019-06-20

## 2018-09-15 PROBLEM — G60.0 CMT (CHARCOT-MARIE-TOOTH DISEASE): Status: ACTIVE | Noted: 2018-09-15

## 2018-10-26 ENCOUNTER — TELEPHONE (OUTPATIENT)
Dept: ENDOCRINOLOGY | Age: 28
End: 2018-10-26
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
## 2018-10-26 NOTE — TELEPHONE ENCOUNTER
stress dose of hydrocortisone for 3 days and drink plenty of water     She may need to be evaluated by PCP also or neurologist as she is not able to move , as in the past may take some time

## 2018-10-26 NOTE — TELEPHONE ENCOUNTER
Asked pt to take stress dose of hydrocortisone for 3 days and drink plenty of water per Dr Ileana Galarza. explaind she may need to see PCP or neurologist as well.

## 2018-10-30 ENCOUNTER — DOCUMENTATION ONLY (OUTPATIENT)
Dept: ENDOCRINOLOGY | Age: 28
End: 2018-10-30

## 2018-10-30 DIAGNOSIS — E27.49 SECONDARY ADRENAL INSUFFICIENCY (HCC): Primary | ICD-10-CM

## 2018-10-30 DIAGNOSIS — E23.6 HYPOPHYSITIS (HCC): ICD-10-CM

## 2018-11-09 ENCOUNTER — TELEPHONE (OUTPATIENT)
Dept: ENDOCRINOLOGY | Age: 28
End: 2018-11-09

## 2018-11-09 NOTE — TELEPHONE ENCOUNTER
Scotty Boucher [phone] from [facility] wants to know if MRI is STAT. Scotty Boucher says patient requires authorization.

## 2018-11-10 ENCOUNTER — HOSPITAL ENCOUNTER (OUTPATIENT)
Dept: MRI IMAGING | Age: 28
Discharge: HOME | End: 2018-11-10
Attending: INTERNAL MEDICINE
Payer: COMMERCIAL

## 2018-11-10 VITALS — BODY MASS INDEX: 36.13 KG/M2 | WEIGHT: 185 LBS

## 2018-11-10 DIAGNOSIS — E27.49 SECONDARY ADRENAL INSUFFICIENCY (HCC): ICD-10-CM

## 2018-11-10 DIAGNOSIS — E23.6 HYPOPHYSITIS (HCC): ICD-10-CM

## 2018-11-10 PROCEDURE — 70553 MRI BRAIN STEM W/O & W/DYE: CPT

## 2018-11-10 PROCEDURE — A9575 INJ GADOTERATE MEGLUMI 0.1ML: HCPCS | Performed by: RADIOLOGY

## 2018-11-10 PROCEDURE — 74011250636 HC RX REV CODE- 250/636: Performed by: RADIOLOGY

## 2018-11-10 RX ORDER — GADOTERATE MEGLUMINE 376.9 MG/ML
16 INJECTION INTRAVENOUS
Status: COMPLETED | OUTPATIENT
Start: 2018-11-10 | End: 2018-11-10

## 2018-11-10 RX ADMIN — GADOTERATE MEGLUMINE 16 ML: 376.9 INJECTION INTRAVENOUS at 18:08

## 2018-11-12 NOTE — TELEPHONE ENCOUNTER
Informed [facility] from [address] [place] [address] that the MRI is not STAT per Dr John Nguyễn. Asked [facility] to return call if needed.

## 2018-11-15 ENCOUNTER — TELEPHONE (OUTPATIENT)
Dept: ENDOCRINOLOGY | Age: 28
End: 2018-11-15

## 2018-11-15 NOTE — TELEPHONE ENCOUNTER
----- Message from Anisha Rubin sent at 11/15/2018 12:10 PM EST -----  Regarding: Dr. Jb Duckworth  Pt requested test results from 11/10/18. Best contact number [phone].

## 2018-11-20 ENCOUNTER — DOCUMENTATION ONLY (OUTPATIENT)
Dept: ENDOCRINOLOGY | Age: 28
End: 2018-11-20

## 2018-11-20 NOTE — PROGRESS NOTES
She does not have to see Dr Misa Deras , can see any Endocrinologist there     Dr Misa Deras I believe specializes in pituitary tumors /[place] and pt does not have pituitary tumor    She has adrenal insufficiency and wants to go there for second opinion as she has various neuropathic symptoms

## 2018-11-20 NOTE — PROGRESS NOTES
Records have been sent to [facility]. Images are in [facility] PACS system. Dr. Hardy Monday office says once records are received and reviewed their office staff will call patient with appointment date and time.     uli

## 2018-11-20 NOTE — PROGRESS NOTES
Spoke to Regino at Dr. Bynum Alu office explained patient does not have a pituitary tumor and only needs second opinion from an endocrinology. Regino says the office has two other endo. specialist in office who can treat patient. Regino instructed me to follow up on Tues with patient appointment date and time.

## 2018-11-27 NOTE — PROGRESS NOTES
Jose Montiel from Dr. Anna Gann office says patient has not been schedule. The records are still under review. Jose Montiel says someone should be able to view records today and call patient today(11/27/18). I will follow up on Thursday.

## 2019-01-30 ENCOUNTER — OFFICE VISIT (OUTPATIENT)
Dept: CARDIOLOGY CLINIC | Age: 29
End: 2019-01-30

## 2019-01-30 VITALS
HEART RATE: 118 BPM | RESPIRATION RATE: 16 BRPM | SYSTOLIC BLOOD PRESSURE: 122 MMHG | HEIGHT: 60 IN | DIASTOLIC BLOOD PRESSURE: 76 MMHG | WEIGHT: 204.6 LBS | BODY MASS INDEX: 40.17 KG/M2 | OXYGEN SATURATION: 98 %

## 2019-01-30 DIAGNOSIS — I47.20 VENTRICULAR TACHYARRHYTHMIA: ICD-10-CM

## 2019-01-30 DIAGNOSIS — R00.0 INAPPROPRIATE SINUS TACHYCARDIA: Primary | ICD-10-CM

## 2019-01-30 NOTE — PROGRESS NOTES
Room # [address] in [address] [place] [address] 12/23/-12/26/18, Adrenal insuff., and VT    C/o SOB, dizziness and elevated HR     Visit Vitals  /76 (BP 1 Location: Left arm, BP Patient Position: Sitting)   Pulse (!) 118   Resp 16   Ht 5' (1.524 m)   Wt 204 lb 9.6 oz (92.8 kg)   SpO2 98%   BMI 39.96 kg/m²

## 2019-01-30 NOTE — PROGRESS NOTES
HISTORY OF PRESENTING ILLNESS      Mel Louise is a 33 y.o. female with CMT, IST/POTS presenting after being diagnosed with secondary adrenal insufficiency recently. She had overall noticed acceptable HR's on her Fitbit however had episodes where her HR elevated and resulted in fatigue. These episodes were usually being driven by pain, etc however she noticed her HR was slow in de-escalating. She was continued on a as needed diltiazem regimen. However since her last visit she was hospitalized in [place] with progression of her secondary adrenal insufficiency. While in the emergency room she experienced 1 of her clinical \"disconnects\" where he became unresponsive. She reports ER staff stating she was exhibiting recurrent episodes of ventricular tachycardia at the time. Strips of this are not available. She is now taking diltiazem daily yet continues to experience wide fluctuations in her heart rates. She is planned for evaluation at [facility] in the next week.        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    Secondary adrenal insufficiency ([address] 08/02/2018    Severe obesity (BMI 35.0-39.9) 07/31/2018    Inappropriate sinus tachycardia 08/11/2016    Tachycardia 07/08/2015    Syncope and collapse 06/27/2012    Bipolar disorder, unspecified ([address] 06/27/2012    Orthostatic hypotension 06/27/2012    UTI (urinary tract infection) 06/27/2012           PAST MEDICAL HISTORY     Past Medical History:   Diagnosis Date    Adrenal insufficiency (HCC)     Asthma     Charcot-Yesi-Tooth disease     Orthostatic hypotension     Other ill-defined conditions(799.89)     neurologicaly induced CMT    Psychiatric disorder     bipolar           PAST SURGICAL HISTORY     Past Surgical History:   Procedure Laterality Date    HX ORTHOPAEDIC      bilat feet          ALLERGIES     Allergies   Allergen Reactions    Benadryl [Diphenhydramine Hcl] Other (comments)     hallucinations    Diphenhydramine Other (comments)    Hydromorphone Other (comments)    Morphine Shortness of Breath    Percocet [Oxycodone-Acetaminophen] Itching          FAMILY HISTORY     Family History   Problem Relation Age of Onset    Cancer Mother     Alcohol abuse Maternal Aunt     Diabetes Maternal Grandmother     Hypertension Maternal Grandmother     Cancer Paternal Grandmother     Diabetes Paternal Grandmother     Alcohol abuse Paternal Grandfather     negative for cardiac disease       SOCIAL HISTORY     Social History     Socioeconomic History    Marital status: SINGLE     Spouse name: Not on file    Number of children: Not on file    Years of education: Not on file    Highest education level: Not on file   Tobacco Use    Smoking status: Never Smoker    Smokeless tobacco: Never Used   Substance and Sexual Activity    Alcohol use: No     Alcohol/week: 0.0 oz    Drug use: No    Sexual activity: Yes     Partners: Male     Birth control/protection: Inserts         MEDICATIONS     Current Outpatient Medications   Medication Sig    phosphorus (VIRT-PHOS 250 NEUTRAL) 250 mg tablet Take 1 Tab by mouth three (3) times daily.  hydrocortisone sodium succ/PF (SOLU-CORTEF, PF,) 100 mg/2 mL solr (Act-O-Vial) 100 mg intramuscular once in the emergency    ibuprofen (MOTRIN) 800 mg tablet TK 1 T PO BID PRN    BREO ELLIPTA 200-25 mcg/dose inhaler Take 1 Puff by inhalation daily.  magnesium 250 mg tab Take  by mouth daily.  dilTIAZem CD (CARDIZEM CD) 120 mg ER capsule Take 1 tablet per day if needed for fast heart rate. Maximum dosage - 1 capsule every 24 hours.  hydrocortisone (CORTEF) 10 mg tablet 1 tablet PO in AM and 1/2 tablet 5 PM (Patient taking differently: 2  tablets PO in AM and 1 tablet 5 PM)    topiramate (TOPAMAX) 50 mg tablet Take 50 mg by mouth three (3) times daily.     hydrOXYzine HCl (ATARAX) 25 mg tablet Take 25 mg by mouth nightly.  olopatadine (PATADAY) 0.2 % drop ophthalmic solution Administer 1 Drop to both eyes as needed.  fluticasone (FLONASE) 50 mcg/actuation nasal spray 2 Sprays by Both Nostrils route daily.  VYVANSE 20 mg capsule 20 mg once daily    montelukast (SINGULAIR) 10 mg tablet Take 10 mg by mouth daily.  Cetirizine (ZYRTEC) 10 mg cap Take 10 mg by mouth daily.  clomiPRAMINE (ANAFRANIL) 50 mg capsule Take 50 mg by mouth nightly.  PROAIR HFA 90 mcg/actuation inhaler INHALE 2 PUFFS PO Q 4 TO 6 H PRN AND 10-15 MINUTES BEFORE EXERCISE    escitalopram oxalate (LEXAPRO) 20 mg tablet Take 20 mg by mouth daily.  clonazePAM (KLONOPIN) 0.5 mg tablet Take 0.5 mg by mouth three (3) times daily as needed.  LAMOTRIGINE (LAMICTAL PO) Take 100 mg by mouth every other day.  NORETHINDRONE (JOLIVETTE PO) Take 0.35 mg by mouth daily. No current facility-administered medications for this visit. I have reviewed the nurses notes, vitals, problem list, allergy list, medical history, family, social history and medications. REVIEW OF SYMPTOMS      General: Pt denies excessive weight gain or loss. Pt is able to conduct ADL's  HEENT: Denies blurred vision, headaches, hearing loss, epistaxis and difficulty swallowing. Respiratory: Denies cough, congestion, shortness of breath, BRANDON, wheezing or stridor. Cardiovascular: Denies precordial pain, palpitations, edema or PND  Gastrointestinal: Denies poor appetite, indigestion, abdominal pain or blood in stool  Genitourinary: Denies hematuria, dysuria, increased urinary frequency  Musculoskeletal: Denies joint pain or swelling from muscles or joints  Neurologic: Denies tremor, paresthesias, headache, or sensory motor disturbance  Psychiatric: Denies confusion, insomnia, depression  Integumentray: Denies rash, itching or ulcers.   Hematologic: Denies easy bruising, bleeding       PHYSICAL EXAMINATION      Vitals:    01/30/19 1426   BP: 122/76   Pulse: (!) 118 Resp: 16   SpO2: 98%   Weight: 204 lb 9.6 oz (92.8 kg)   Height: 5' (1.524 m)     General: Well developed, in no acute distress. HEENT: No jaundice, oral mucosa moist, no oral ulcers  Neck: Supple, no stiffness, no lymphadenopathy, supple  Heart:  Normal S1/S2 negative S3 or S4. Regular, no murmur, gallop or rub, no jugular venous distention  Respiratory: Clear bilaterally x 4, no wheezing or rales  Abdomen:   Soft, non-tender, bowel sounds are active.   Extremities:  No edema, normal cap refill, no cyanosis. Musculoskeletal: No clubbing, no deformities  Neuro: A&Ox3, speech clear, gait stable, cooperative, no focal neurologic deficits  Skin: Skin color is normal. No rashes or lesions. Non diaphoretic, moist.  Vascular: 2+ pulses symmetric in all extremities       DIAGNOSTIC DATA      EKG:        LABORATORY DATA      Lab Results   Component Value Date/Time    WBC 6.0 09/07/2017 05:08 PM    Hemoglobin (POC) 13.9 09/07/2017 05:10 PM    HGB 13.3 09/07/2017 05:08 PM    Hematocrit (POC) 41 09/07/2017 05:10 PM    HCT 41.5 09/07/2017 05:08 PM    PLATELET 001 44/58/6038 05:08 PM    MCV 91.0 09/07/2017 05:08 PM      Lab Results   Component Value Date/Time    Sodium 141 06/27/2012 07:50 PM    Potassium 4.2 06/27/2012 07:50 PM    Chloride 106 06/27/2012 07:50 PM    CO2 29 06/27/2012 07:50 PM    Anion gap 6 06/27/2012 07:50 PM    Glucose 83 06/27/2012 07:50 PM    BUN 7 06/27/2012 07:50 PM    Creatinine 0.8 06/27/2012 07:50 PM    BUN/Creatinine ratio 9 (L) 06/27/2012 07:50 PM    GFR est AA >60 06/27/2012 07:50 PM    GFR est non-AA >60 06/27/2012 07:50 PM    Calcium 9.2 06/27/2012 07:50 PM    Bilirubin, total 0.3 09/07/2017 05:08 PM    AST (SGOT) 13 (L) 09/07/2017 05:08 PM    Alk.  phosphatase 83 09/07/2017 05:08 PM    Protein, total 7.5 09/07/2017 05:08 PM    Albumin 3.6 09/07/2017 05:08 PM    Globulin 3.9 09/07/2017 05:08 PM    A-G Ratio 0.9 (L) 09/07/2017 05:08 PM    ALT (SGPT) 27 09/07/2017 05:08 PM           ASSESSMENT 1. CMT  2. Dizziness and lightheadedness              A. Occuring randomly with bending, sitting, climbing stairs  3. POTS vs. IST              A. Narrow complex tachycardia consistent with ST on 30 day monitor  4. Fatigue  5. Syncope    6. VT? PLAN     Patient's potential finding of ventricular arrhythmia during her clinical episode of \"disconnect\" is highly concerning. Recommend injection of a loop recorder for enhanced rhythm monitoring to better determine whether she has a malignant arrhythmia to guide appropriate therapy recommendations.         FOLLOW-UP     Post injection          Cassi Campbell MD  Cardiac Electrophysiology / Cardiology    [address]  [address] [place] [address]  [phone] / [phone] Fax   [phone] / [phone] Fax

## 2019-02-01 RX ORDER — SODIUM CHLORIDE 0.9 % (FLUSH) 0.9 %
5-40 SYRINGE (ML) INJECTION AS NEEDED
Status: CANCELLED | OUTPATIENT
Start: 2019-02-01

## 2019-02-01 RX ORDER — SODIUM CHLORIDE 0.9 % (FLUSH) 0.9 %
5-40 SYRINGE (ML) INJECTION EVERY 8 HOURS
Status: CANCELLED | OUTPATIENT
Start: 2019-02-01

## 2019-02-08 ENCOUNTER — HOSPITAL ENCOUNTER (OUTPATIENT)
Age: 29
Setting detail: OUTPATIENT SURGERY
Discharge: HOME | End: 2019-02-08
Attending: INTERNAL MEDICINE | Admitting: INTERNAL MEDICINE
Payer: MEDICARE

## 2019-02-08 VITALS
RESPIRATION RATE: 21 BRPM | SYSTOLIC BLOOD PRESSURE: 115 MMHG | OXYGEN SATURATION: 100 % | HEIGHT: 60 IN | DIASTOLIC BLOOD PRESSURE: 80 MMHG | HEART RATE: 107 BPM | TEMPERATURE: 98.2 F | BODY MASS INDEX: 39.34 KG/M2 | WEIGHT: 200.4 LBS

## 2019-02-08 PROCEDURE — 77030031139 HC SUT VCRL2 J&J -A: Performed by: INTERNAL MEDICINE

## 2019-02-08 PROCEDURE — 77030012935 HC DRSG AQUACEL BMS -B: Performed by: INTERNAL MEDICINE

## 2019-02-08 PROCEDURE — 74011250636 HC RX REV CODE- 250/636: Performed by: INTERNAL MEDICINE

## 2019-02-08 PROCEDURE — 33285 INSJ SUBQ CAR RHYTHM MNTR: CPT | Performed by: INTERNAL MEDICINE

## 2019-02-08 PROCEDURE — C1764 EVENT RECORDER, CARDIAC: HCPCS | Performed by: INTERNAL MEDICINE

## 2019-02-08 DEVICE — RECORDER CARD MON REVEAL LINQ MYCARELINK IMPL LINQSYS: Type: IMPLANTABLE DEVICE | Status: FUNCTIONAL

## 2019-02-08 RX ORDER — SODIUM CHLORIDE 0.9 % (FLUSH) 0.9 %
5-40 SYRINGE (ML) INJECTION EVERY 8 HOURS
Status: DISCONTINUED | OUTPATIENT
Start: 2019-02-08 | End: 2019-02-08 | Stop reason: HOSPADM

## 2019-02-08 RX ORDER — GENTAMICIN SULFATE 80 MG/100ML
80 INJECTION, SOLUTION INTRAVENOUS ONCE
Status: COMPLETED | OUTPATIENT
Start: 2019-02-08 | End: 2019-02-08

## 2019-02-08 RX ORDER — CEFAZOLIN SODIUM/WATER 2 G/20 ML
2 SYRINGE (ML) INTRAVENOUS ONCE
Status: COMPLETED | OUTPATIENT
Start: 2019-02-08 | End: 2019-02-08

## 2019-02-08 RX ORDER — LIDOCAINE HYDROCHLORIDE AND EPINEPHRINE 10; 10 MG/ML; UG/ML
30 INJECTION, SOLUTION INFILTRATION; PERINEURAL ONCE
Status: DISCONTINUED | OUTPATIENT
Start: 2019-02-08 | End: 2019-02-08 | Stop reason: HOSPADM

## 2019-02-08 RX ORDER — SODIUM CHLORIDE 0.9 % (FLUSH) 0.9 %
5-40 SYRINGE (ML) INJECTION AS NEEDED
Status: DISCONTINUED | OUTPATIENT
Start: 2019-02-08 | End: 2019-02-08 | Stop reason: HOSPADM

## 2019-02-08 RX ADMIN — GENTAMICIN SULFATE 80 MG: 80 INJECTION, SOLUTION INTRAVENOUS at 10:07

## 2019-02-08 RX ADMIN — Medication 2 G: at 09:58

## 2019-02-08 NOTE — DISCHARGE INSTRUCTIONS
Loop Recorder  Discharge Instructions    Please make sure you have received your Temporary Loop Recorder identification card with your discharge instructions      MEDICATIONS         Take only the medications prescribed to you at discharge. ACTIVITY         Return to your normal activity, except as noted below. o Avoid tight clothes or unnecessary pressure over your incision (such as bra straps or seat belts). If it is tender or sensitive to clothing, cover the incision with a soft dressing or pad.  o Questions about driving are individualized and should be discussed with one of the EP Physicians prior to discharge. SHOWERING         Leave the bandage over your incision for 7 days after the Loop Recorder implant. You bandage will be removed in clinic in 7 days.  It is important to keep the bandaged area clean and dry. You may shower around the site until the bandage is removed in clinic. Thereafter, you may shower after the bandage is removed, washing it gently with soap and water. Do not apply any lotions, powders, or perfumes to the incision line.  Avoid submerging your incision in water (tub baths, hot tubs, or swimming) for two weeks.  Underneath the dressing. o If you have white steri-strips over your incision (underneath the gauze dressing), they will curl up at the end and fall off, usually within 10 days. Do not pull them off.  - OR -   o You may have a different type of closure for the incision. If Dermabond Adhesive was used to close your incision, you will receive a separate instruction sheet. DISCHARGE PRECAUTIONS         Record your temperature every day, at the same time, for 3 weeks after your implant. A temperature of 100.5 F, or higher, can be the first sign of infection. This should be reported to your Doctor immediately.  You can have an MRI after 6 weeks. You must be aware that any strong magnet or magnetic field can affect your Loop Recorder. In general, be careful of metal detectors, heavy machinery, and any area where arc-welding is performed. Avoid metal detectors such as the ones in security checkpoints at [facility] or [address] [place] [address]. When approaching a security checkpoint show your Loop Recorder ID Card to security personnel and ask to be hand searched.  Always tell your doctor or dentist that you have a Loop Recorder. Antibiotics may be prescribed before certain procedures.  Your temporary identification will be given to you with these instructions. Keep your device card in your wallet or on your person at all times. You should receive your device in 8 weeks. If you do not receive your permanent card, please call the office at [phone]. TAKING YOUR PULSE         Take your pulse the same time every day, preferably in the morning.  Sit down and rest for 5 minutes prior to taking your pulse.  Take your pulse for 1 full minute, use a clock or stop watch with a second hand.  To feel your pulse, use the first two fingers of one hand; place them on the thumb side of the wrist of the opposite hand. The pulse will be steady, regular and throbbing.  Call the EP Lab Doctors if your pulse is less than 40 beats per minute. SYMPTOMS THAT NEED TO BE REPORTED IMMEDIATELY         Temperature more than 100.4 F     Redness or warmth at the incision site, or pain for longer than the first 5 days after the implant.  Drainage from the incision site.  Swelling around the incision site.  Shortness of breath.  Rapid heart rate or palpitations.  Dizziness, lightheadedness, fainting.  Slow pulse below 40 beats per minute.  REMEMBER: If you feel something is an emergency or cannot be handled over the phone, call 911 or go to the closest emergency room.           Ilana Spears MD  Cardiac Electrophysiology / Cardiology    [address] [place] [address] [address] [facility], [address] [facility], [address] [place] [address],[address]  Christiana Burris [place]  [phone] / [phone] Fax       [phone] / [phone] Fax

## 2019-02-08 NOTE — PROGRESS NOTES
9:11 AM      Patient arrived. ID and allergies verified verbally with patient. Pt voices understanding of procedure to be performed. Consent obtained. Pt prepped for procedure. 9:44 AM    MomLinda at bedside          1030    Patient sitting up in bed eating crackers  Awake and alert      10:43 AM      Linda Hansen at bedside      Discharge instructions reviewed with patient and family. Voiced understanding. Patient given copy of discharge instructions to take home. 11:06 AM    Pt discharged via wheelchair with SEVEN [employer]ae[employer]. Patient discharged into care of her mother, Sarahi Pappas. Personal belongings with patient upon discharge.

## 2019-02-08 NOTE — H&P
HISTORY OF PRESENTING ILLNESS      Monserrat Crystal is a 33 y.o. female with CMT, IST/POTS presenting after being diagnosed with secondary adrenal insufficiency recently. She had overall noticed acceptable HR's on her Fitbit however had episodes where her HR elevated and resulted in fatigue. These episodes were usually being driven by pain, etc however she noticed her HR was slow in de-escalating. She was continued on a as needed diltiazem regimen. However since her last visit she was hospitalized in [place] with progression of her secondary adrenal insufficiency. While in the emergency room she experienced 1 of her clinical \"disconnects\" where he became unresponsive. She reports ER staff stating she was exhibiting recurrent episodes of ventricular tachycardia at the time. Strips of this are not available. She is now taking diltiazem daily yet continues to experience wide fluctuations in her heart rates.   She is planned for evaluation at [facility] in the next week.         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    Secondary adrenal insufficiency ([address] 08/02/2018    Severe obesity (BMI 35.0-39.9) 07/31/2018    Inappropriate sinus tachycardia 08/11/2016    Tachycardia 07/08/2015    Syncope and collapse 06/27/2012    Bipolar disorder, unspecified ([address] 06/27/2012    Orthostatic hypotension 06/27/2012    UTI (urinary tract infection) 06/27/2012             PAST MEDICAL HISTORY           Past Medical History:   Diagnosis Date    Adrenal insufficiency (HCC)      Asthma      Charcot-Yesi-Tooth disease      Orthostatic hypotension      Other ill-defined conditions(799.89)       neurologicaly induced CMT    Psychiatric disorder       bipolar             PAST SURGICAL HISTORY            Past Surgical History:   Procedure Laterality Date    HX ORTHOPAEDIC         bilat feet             ALLERGIES     Allergies   Allergen Reactions    Benadryl [Diphenhydramine Hcl] Other (comments)       hallucinations    Diphenhydramine Other (comments)    Hydromorphone Other (comments)    Morphine Shortness of Breath    Percocet [Oxycodone-Acetaminophen] Itching            FAMILY HISTORY            Family History   Problem Relation Age of Onset    Cancer Mother      Alcohol abuse Maternal Aunt      Diabetes Maternal Grandmother      Hypertension Maternal Grandmother      Cancer Paternal Grandmother      Diabetes Paternal Grandmother      Alcohol abuse Paternal Grandfather      negative for cardiac disease         SOCIAL HISTORY      Social History               Socioeconomic History    Marital status: SINGLE       Spouse name: Not on file    Number of children: Not on file    Years of education: Not on file    Highest education level: Not on file   Tobacco Use    Smoking status: Never Smoker    Smokeless tobacco: Never Used   Substance and Sexual Activity    Alcohol use: No       Alcohol/week: 0.0 oz    Drug use: No    Sexual activity: Yes       Partners: Male       Birth control/protection: Inserts               MEDICATIONS           Current Outpatient Medications   Medication Sig    phosphorus (VIRT-PHOS 250 NEUTRAL) 250 mg tablet Take 1 Tab by mouth three (3) times daily.  hydrocortisone sodium succ/PF (SOLU-CORTEF, PF,) 100 mg/2 mL solr (Act-O-Vial) 100 mg intramuscular once in the emergency    ibuprofen (MOTRIN) 800 mg tablet TK 1 T PO BID PRN    BREO ELLIPTA 200-25 mcg/dose inhaler Take 1 Puff by inhalation daily.  magnesium 250 mg tab Take  by mouth daily.  dilTIAZem CD (CARDIZEM CD) 120 mg ER capsule Take 1 tablet per day if needed for fast heart rate. Maximum dosage - 1 capsule every 24 hours.     hydrocortisone (CORTEF) 10 mg tablet 1 tablet PO in AM and 1/2 tablet 5 PM (Patient taking differently: 2  tablets PO in AM and 1 tablet 5 PM)    topiramate (TOPAMAX) 50 mg tablet Take 50 mg by mouth three (3) times daily.  hydrOXYzine HCl (ATARAX) 25 mg tablet Take 25 mg by mouth nightly.  olopatadine (PATADAY) 0.2 % drop ophthalmic solution Administer 1 Drop to both eyes as needed.  fluticasone (FLONASE) 50 mcg/actuation nasal spray 2 Sprays by Both Nostrils route daily.  VYVANSE 20 mg capsule 20 mg once daily    montelukast (SINGULAIR) 10 mg tablet Take 10 mg by mouth daily.  Cetirizine (ZYRTEC) 10 mg cap Take 10 mg by mouth daily.  clomiPRAMINE (ANAFRANIL) 50 mg capsule Take 50 mg by mouth nightly.  PROAIR HFA 90 mcg/actuation inhaler INHALE 2 PUFFS PO Q 4 TO 6 H PRN AND 10-15 MINUTES BEFORE EXERCISE    escitalopram oxalate (LEXAPRO) 20 mg tablet Take 20 mg by mouth daily.  clonazePAM (KLONOPIN) 0.5 mg tablet Take 0.5 mg by mouth three (3) times daily as needed.  LAMOTRIGINE (LAMICTAL PO) Take 100 mg by mouth every other day.  NORETHINDRONE (JOLIVETTE PO) Take 0.35 mg by mouth daily.      No current facility-administered medications for this visit.          I have reviewed the nurses notes, vitals, problem list, allergy list, medical history, family, social history and medications.         REVIEW OF SYMPTOMS      General: Pt denies excessive weight gain or loss. Pt is able to conduct ADL's  HEENT: Denies blurred vision, headaches, hearing loss, epistaxis and difficulty swallowing. Respiratory: Denies cough, congestion, shortness of breath, BRANDON, wheezing or stridor. Cardiovascular: Denies precordial pain, palpitations, edema or PND  Gastrointestinal: Denies poor appetite, indigestion, abdominal pain or blood in stool  Genitourinary: Denies hematuria, dysuria, increased urinary frequency  Musculoskeletal: Denies joint pain or swelling from muscles or joints  Neurologic: Denies tremor, paresthesias, headache, or sensory motor disturbance  Psychiatric: Denies confusion, insomnia, depression  Integumentray: Denies rash, itching or ulcers.   Hematologic: Denies easy bruising, bleeding         PHYSICAL EXAMINATION          Vitals:     01/30/19 1426   BP: 122/76   Pulse: (!) 118   Resp: 16   SpO2: 98%   Weight: 204 lb 9.6 oz (92.8 kg)   Height: 5' (1.524 m)      General: Well developed, in no acute distress. HEENT: No jaundice, oral mucosa moist, no oral ulcers  Neck: Supple, no stiffness, no lymphadenopathy, supple  Heart:  Normal S1/S2 negative S3 or S4. Regular, no murmur, gallop or rub, no jugular venous distention  Respiratory: Clear bilaterally x 4, no wheezing or rales  Abdomen:   Soft, non-tender, bowel sounds are active.   Extremities:  No edema, normal cap refill, no cyanosis. Musculoskeletal: No clubbing, no deformities  Neuro: A&Ox3, speech clear, gait stable, cooperative, no focal neurologic deficits  Skin: Skin color is normal. No rashes or lesions. Non diaphoretic, moist.  Vascular: 2+ pulses symmetric in all extremities         DIAGNOSTIC DATA      EKG:          LABORATORY DATA            Lab Results   Component Value Date/Time     WBC 6.0 09/07/2017 05:08 PM     Hemoglobin (POC) 13.9 09/07/2017 05:10 PM     HGB 13.3 09/07/2017 05:08 PM     Hematocrit (POC) 41 09/07/2017 05:10 PM     HCT 41.5 09/07/2017 05:08 PM     PLATELET 577 90/85/2595 05:08 PM     MCV 91.0 09/07/2017 05:08 PM            Lab Results   Component Value Date/Time     Sodium 141 06/27/2012 07:50 PM     Potassium 4.2 06/27/2012 07:50 PM     Chloride 106 06/27/2012 07:50 PM     CO2 29 06/27/2012 07:50 PM     Anion gap 6 06/27/2012 07:50 PM     Glucose 83 06/27/2012 07:50 PM     BUN 7 06/27/2012 07:50 PM     Creatinine 0.8 06/27/2012 07:50 PM     BUN/Creatinine ratio 9 (L) 06/27/2012 07:50 PM     GFR est AA >60 06/27/2012 07:50 PM     GFR est non-AA >60 06/27/2012 07:50 PM     Calcium 9.2 06/27/2012 07:50 PM     Bilirubin, total 0.3 09/07/2017 05:08 PM     AST (SGOT) 13 (L) 09/07/2017 05:08 PM     Alk.  phosphatase 83 09/07/2017 05:08 PM     Protein, total 7.5 09/07/2017 05:08 PM     Albumin 3.6 09/07/2017 05:08 PM     Globulin 3.9 09/07/2017 05:08 PM     A-G Ratio 0.9 (L) 09/07/2017 05:08 PM     ALT (SGPT) 27 09/07/2017 05:08 PM             ASSESSMENT      1. CMT  2. Dizziness and lightheadedness              A. Occuring randomly with bending, sitting, climbing stairs  3. POTS vs. IST              A. Narrow complex tachycardia consistent with ST on 30 day monitor  4. Fatigue  5.  Syncope    6. VT?         PLAN       Recommend injection of a loop recorder for enhanced rhythm monitoring                     Sav Shipman MD  Cardiac Electrophysiology / Cardiology     [address] [place] [address], [address], [address]  Christiana Burris [place]  [phone] / [phone] Fax                                    [phone] / [phone] Fax

## 2019-02-08 NOTE — Clinical Note
TRANSFER - OUT REPORT:     Verbal report given to: lizandro. Report consisted of patient's Situation, Background, Assessment and   Recommendations(SBAR). Opportunity for questions and clarification was provided. Patient transported with a Registered Nurse.

## 2019-02-08 NOTE — PROCEDURES
Cardiac Electrophysiology Report      PATIENT INFORMATION     Patient Name: Shar Ramsey MRN: [id number]       Study Date: 2019    YOB: 1990  Age: 33 y.o. Gender: female      Procedure:  Loop Recorder Injectiona        STAFF     Duty Name   Electrophysiologist Steve Bolanos MD   Monitor Wing Steve RN       PATIENT HISTORY     Angélica Green a 33 y.o. female with CMT, IST/POTS presenting after being diagnosed with secondary adrenal insufficiency recently. She had overall noticed acceptable HR's on her Fitbit however had episodes where her HR elevated and resulted in fatigue. These episodes were usually being driven by pain, etc however she noticed her HR was slow in de-escalating.  She was continued on a as needed diltiazem regimen.  However since her last visit she was hospitalized in [place] with progression of her secondary adrenal insufficiency.  While in the emergency room she experienced 1 of her clinical \"disconnects\" where he became unresponsive.  She reports ER staff stating she was exhibiting recurrent episodes of ventricular tachycardia at the time.  Strips of this are not available. Harman Menendez is now taking diltiazem daily yet continues to experience wide fluctuations in her heart rates. She presents for injection of a loop recorder for enhanced rhythm monitoring.        PROCEDURE     The patient was brought to the Cardiac preparatory area in a post-absorptive, fasting state. Informed consent was obtained. A peripheral IV was in place. Continuous electrocardiographic, blood pressure, O2 saturation and  CO2 monitoring was initiated. Pre-operative antibiotics were administered pre-operatively. Self-adhesive cardioversion patches were positioned on the chest.  Conscious sedation was effectuated according to protocol. The patient was then prepped and draped in the usual sterile fashion.   A 50/50 mixture of lidocaine (1%) with epinephrine and bupivicaine (0.5%) was utilized for local anesthesia. A blunt incision was delivered along the left sternal border between the 3rd and 4th intercostal space. A loop recorder was then injected successfully using a [employer] loop recorder injection tool. Dermabond adhesive glue was applied to the skin. The patient remained hemodynamically stable, tolerated the procedure well and was transferred in stable condition. There were no immediate complications encountered during the procedure. There was minimal blood loss and no specimen were removed. LEAD & GENERATOR DATA     See attached report      MEDICATION SUMMARY     Medication Route Unit Total   Ancef IV grams 1       RADIOLOGY SUMMARY     N/A      CONCLUSIONS     1. Successful injection of loop recorder. 2. Wound check in EP clinic in 10 days. 3. Patient refused oral antibiotics  4. Follow up in EP clinic in 6 weeks or earlier if necessary.       Cecilia Walker MD  Cardiac Electrophysiology / Cardiology    [address]  [address] [place] [address], [address] [facility], [address]  [address]  [phone] / [phone] Fax [phone] / [phone] Fax

## 2019-03-04 ENCOUNTER — OP HISTORICAL/CONVERTED ENCOUNTER (OUTPATIENT)
Dept: OTHER | Age: 29
End: 2019-03-04

## 2019-03-27 ENCOUNTER — OFFICE VISIT (OUTPATIENT)
Dept: CARDIOLOGY CLINIC | Age: 29
End: 2019-03-27

## 2019-03-27 VITALS
WEIGHT: 211 LBS | OXYGEN SATURATION: 98 % | BODY MASS INDEX: 41.43 KG/M2 | HEART RATE: 112 BPM | HEIGHT: 60 IN | DIASTOLIC BLOOD PRESSURE: 82 MMHG | RESPIRATION RATE: 16 BRPM | SYSTOLIC BLOOD PRESSURE: 104 MMHG

## 2019-03-27 DIAGNOSIS — R55 SYNCOPE AND COLLAPSE: Primary | ICD-10-CM

## 2019-03-27 RX ORDER — OMEPRAZOLE 40 MG/1
40 CAPSULE, DELAYED RELEASE ORAL
COMMUNITY

## 2019-03-27 NOTE — PROGRESS NOTES
Room # 2    SOB    Visit Vitals  /82 (BP 1 Location: Left arm, BP Patient Position: Sitting)   Pulse (!) 112   Resp 16   Ht 5' (1.524 m)   Wt 211 lb (95.7 kg) Comment: patient reported   SpO2 98%   BMI 41.21 kg/m²

## 2019-03-27 NOTE — PROGRESS NOTES
HISTORY OF PRESENTING ILLNESS      Lucero Silva is a 33 y.o. female with CMT, IST/POTS presenting after being diagnosed with secondary adrenal insufficiency recently. She had overall noticed acceptable HR's on her Fitbit however had episodes where her HR elevated and resulted in fatigue. These episodes were usually being driven by pain, etc however she noticed her HR was slow in de-escalating.  She was continued on a as needed diltiazem regimen.  However she was subsequently hospitalized in [place] with progression of her secondary adrenal insufficiency.  While in the emergency room she experienced 1 of her clinical \"disconnects\" where he became unresponsive.  She reported ER staff stating she was exhibiting recurrent episodes of ventricular tachycardia at the time.  Strips of this were not available for review.  She was back to taking diltiazem daily. She underwent injection of a loop recorder to monitor for ventricular arrhythmias. ILR thus far reveals only sinus tachycardia with oversensing. She triggered the ILR once (though not for her full blown clinical episode) during which SR was observed.         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    Secondary adrenal insufficiency ([facility] [address] 08/02/2018    Severe obesity (BMI 35.0-39.9) 07/31/2018    Inappropriate sinus tachycardia 08/11/2016    Tachycardia 07/08/2015    Syncope and collapse 06/27/2012    Bipolar disorder, unspecified ([facility] [address] 06/27/2012    Orthostatic hypotension 06/27/2012    UTI (urinary tract infection) 06/27/2012           PAST MEDICAL HISTORY     Past Medical History:   Diagnosis Date    Adrenal insufficiency (HCC)     Asthma     Charcot-Yesi-Tooth disease     Orthostatic hypotension     Other ill-defined conditions(799.89)     neurologicaly induced CMT    Psychiatric disorder     bipolar           PAST SURGICAL HISTORY     Past Surgical History: Procedure Laterality Date    HX ORTHOPAEDIC      bilat feet    [place] INSERTION SUBQ CARDIAC RHYTHM MONITOR W/PRGRMG N/A 2/8/2019    LOOP RECORDER INSERT performed by Suzette Benavidez MD at [facility]          ALLERGIES     Allergies   Allergen Reactions    Benadryl [Diphenhydramine Hcl] Other (comments)     hallucinations    Diphenhydramine Other (comments)    Hydromorphone Other (comments)    Morphine Shortness of Breath    Percocet [Oxycodone-Acetaminophen] Itching          FAMILY HISTORY     Family History   Problem Relation Age of Onset    Cancer Mother     Alcohol abuse Maternal Aunt     Diabetes Maternal Grandmother     Hypertension Maternal Grandmother     Cancer Paternal Grandmother     Diabetes Paternal Grandmother     Alcohol abuse Paternal Grandfather     negative for cardiac disease       SOCIAL HISTORY     Social History     Socioeconomic History    Marital status: SINGLE     Spouse name: Not on file    Number of children: Not on file    Years of education: Not on file    Highest education level: Not on file   Tobacco Use    Smoking status: Never Smoker    Smokeless tobacco: Never Used   Substance and Sexual Activity    Alcohol use: No     Alcohol/week: 0.0 oz    Drug use: No    Sexual activity: Yes     Partners: Male     Birth control/protection: Inserts         MEDICATIONS     Current Outpatient Medications   Medication Sig    phosphorus (VIRT-PHOS 250 NEUTRAL) 250 mg tablet Take 1 Tab by mouth three (3) times daily.  hydrocortisone sodium succ/PF (SOLU-CORTEF, PF,) 100 mg/2 mL solr (Act-O-Vial) 100 mg intramuscular once in the emergency    ibuprofen (MOTRIN) 800 mg tablet TK 1 T PO BID PRN    BREO ELLIPTA 200-25 mcg/dose inhaler Take 1 Puff by inhalation daily.  magnesium 250 mg tab Take  by mouth daily.  dilTIAZem CD (CARDIZEM CD) 120 mg ER capsule Take 1 tablet per day if needed for fast heart rate. Maximum dosage - 1 capsule every 24 hours.     hydrocortisone (CORTEF) 10 mg tablet 1 tablet PO in AM and 1/2 tablet 5 PM (Patient taking differently: 2  tablets PO in AM and 1 tablet 5 PM)    topiramate (TOPAMAX) 50 mg tablet Take 50 mg by mouth three (3) times daily.  hydrOXYzine HCl (ATARAX) 25 mg tablet Take 25 mg by mouth nightly.  olopatadine (PATADAY) 0.2 % drop ophthalmic solution Administer 1 Drop to both eyes as needed.  fluticasone (FLONASE) 50 mcg/actuation nasal spray 2 Sprays by Both Nostrils route daily.  VYVANSE 20 mg capsule 20 mg once daily    montelukast (SINGULAIR) 10 mg tablet Take 10 mg by mouth daily.  Cetirizine (ZYRTEC) 10 mg cap Take 10 mg by mouth daily.  clomiPRAMINE (ANAFRANIL) 50 mg capsule Take 50 mg by mouth nightly.  PROAIR HFA 90 mcg/actuation inhaler INHALE 2 PUFFS PO Q 4 TO 6 H PRN AND 10-15 MINUTES BEFORE EXERCISE    escitalopram oxalate (LEXAPRO) 20 mg tablet Take 20 mg by mouth daily.  clonazePAM (KLONOPIN) 0.5 mg tablet Take 0.5 mg by mouth three (3) times daily as needed.  LAMOTRIGINE (LAMICTAL PO) Take 100 mg by mouth every other day.  NORETHINDRONE (JOLIVETTE PO) Take 0.35 mg by mouth daily. No current facility-administered medications for this visit. I have reviewed the nurses notes, vitals, problem list, allergy list, medical history, family, social history and medications. REVIEW OF SYMPTOMS      General: Pt denies excessive weight gain or loss. Pt is able to conduct ADL's  HEENT: Denies blurred vision, headaches, hearing loss, epistaxis and difficulty swallowing. Respiratory: Denies cough, congestion, shortness of breath, BRANDON, wheezing or stridor.   Cardiovascular: Denies precordial pain, palpitations, edema or PND  Gastrointestinal: Denies poor appetite, indigestion, abdominal pain or blood in stool  Genitourinary: Denies hematuria, dysuria, increased urinary frequency  Musculoskeletal: Denies joint pain or swelling from muscles or joints  Neurologic: Denies tremor, paresthesias, headache, or sensory motor disturbance  Psychiatric: Denies confusion, insomnia, depression  Integumentray: Denies rash, itching or ulcers. Hematologic: Denies easy bruising, bleeding       PHYSICAL EXAMINATION      There were no vitals filed for this visit. General: Well developed, in no acute distress. HEENT: No jaundice, oral mucosa moist, no oral ulcers  Neck: Supple, no stiffness, no lymphadenopathy, supple  Heart:  Normal S1/S2 negative S3 or S4. Regular, no murmur, gallop or rub, no jugular venous distention  Respiratory: Clear bilaterally x 4, no wheezing or rales  Abdomen:   Soft, non-tender, bowel sounds are active.   Extremities:  No edema, normal cap refill, no cyanosis. Musculoskeletal: No clubbing, no deformities  Neuro: A&Ox3, speech clear, gait stable, cooperative, no focal neurologic deficits  Skin: Skin color is normal. No rashes or lesions. Non diaphoretic, moist.  Vascular: 2+ pulses symmetric in all extremities       DIAGNOSTIC DATA      EKG:        LABORATORY DATA      Lab Results   Component Value Date/Time    WBC 6.0 09/07/2017 05:08 PM    Hemoglobin (POC) 13.9 09/07/2017 05:10 PM    HGB 13.3 09/07/2017 05:08 PM    Hematocrit (POC) 41 09/07/2017 05:10 PM    HCT 41.5 09/07/2017 05:08 PM    PLATELET 654 39/07/9413 05:08 PM    MCV 91.0 09/07/2017 05:08 PM      Lab Results   Component Value Date/Time    Sodium 141 06/27/2012 07:50 PM    Potassium 4.2 06/27/2012 07:50 PM    Chloride 106 06/27/2012 07:50 PM    CO2 29 06/27/2012 07:50 PM    Anion gap 6 06/27/2012 07:50 PM    Glucose 83 06/27/2012 07:50 PM    BUN 7 06/27/2012 07:50 PM    Creatinine 0.8 06/27/2012 07:50 PM    BUN/Creatinine ratio 9 (L) 06/27/2012 07:50 PM    GFR est AA >60 06/27/2012 07:50 PM    GFR est non-AA >60 06/27/2012 07:50 PM    Calcium 9.2 06/27/2012 07:50 PM    Bilirubin, total 0.3 09/07/2017 05:08 PM    AST (SGOT) 13 (L) 09/07/2017 05:08 PM    Alk.  phosphatase 83 09/07/2017 05:08 PM    Protein, total 7.5 09/07/2017 05:08 PM    Albumin 3.6 09/07/2017 05:08 PM    Globulin 3.9 09/07/2017 05:08 PM    A-G Ratio 0.9 (L) 09/07/2017 05:08 PM    ALT (SGPT) 27 09/07/2017 05:08 PM           ASSESSMENT      1. CMT  2. Dizziness and lightheadedness              A. Occuring randomly with bending, sitting, climbing stairs  3. POTS vs. IST              A. Narrow complex tachycardia consistent with ST on 30 day monitor  4. Fatigue  5. Syncope   6. VT? PLAN     Trial of diltiazem 240 mg daily. Continue follow up in device clinic.         FOLLOW-UP     1 year        Anahi Madrigal MD  Cardiac Electrophysiology / Cardiology    [address]  [address] [place] [address]  [phone] / [phone] Fax   [phone] / [phone] Fax

## 2019-03-28 ENCOUNTER — TELEPHONE (OUTPATIENT)
Dept: CARDIOLOGY CLINIC | Age: 29
End: 2019-03-28

## 2019-03-28 NOTE — TELEPHONE ENCOUNTER
Called patient. Left VM. Inquired if prescription for diltiazem 240mg was needed or if patient had medication at home. Will send to pharmacy if needed.

## 2019-06-20 ENCOUNTER — HOSPITAL ENCOUNTER (INPATIENT)
Age: 29
LOS: 3 days | Discharge: HOME | DRG: 309 | End: 2019-06-23
Attending: INTERNAL MEDICINE | Admitting: INTERNAL MEDICINE
Payer: MEDICARE

## 2019-06-20 PROBLEM — Z79.899 VISIT FOR MONITORING TIKOSYN THERAPY: Status: ACTIVE | Noted: 2019-06-20

## 2019-06-20 PROBLEM — Z51.81 VISIT FOR MONITORING TIKOSYN THERAPY: Status: ACTIVE | Noted: 2019-06-20

## 2019-06-20 LAB
ANION GAP SERPL CALC-SCNC: 4 MMOL/L (ref 5–15)
ATRIAL RATE: 92 BPM
BUN SERPL-MCNC: 17 MG/DL (ref 6–20)
BUN/CREAT SERPL: 22 (ref 12–20)
CALCIUM SERPL-MCNC: 8.6 MG/DL (ref 8.5–10.1)
CALCULATED P AXIS, ECG09: 42 DEGREES
CALCULATED R AXIS, ECG10: 50 DEGREES
CALCULATED T AXIS, ECG11: 33 DEGREES
CHLORIDE SERPL-SCNC: 107 MMOL/L (ref 97–108)
CO2 SERPL-SCNC: 29 MMOL/L (ref 21–32)
CREAT SERPL-MCNC: 0.77 MG/DL (ref 0.55–1.02)
DIAGNOSIS, 93000: NORMAL
ERYTHROCYTE [DISTWIDTH] IN BLOOD BY AUTOMATED COUNT: 14 % (ref 11.5–14.5)
GLUCOSE SERPL-MCNC: 122 MG/DL (ref 65–100)
HCT VFR BLD AUTO: 39.1 % (ref 35–47)
HGB BLD-MCNC: 11.9 G/DL (ref 11.5–16)
MAGNESIUM SERPL-MCNC: 2.2 MG/DL (ref 1.6–2.4)
MCH RBC QN AUTO: 27.1 PG (ref 26–34)
MCHC RBC AUTO-ENTMCNC: 30.4 G/DL (ref 30–36.5)
MCV RBC AUTO: 89.1 FL (ref 80–99)
NRBC # BLD: 0 K/UL (ref 0–0.01)
NRBC BLD-RTO: 0 PER 100 WBC
P-R INTERVAL, ECG05: 124 MS
PLATELET # BLD AUTO: 288 K/UL (ref 150–400)
PMV BLD AUTO: 10.7 FL (ref 8.9–12.9)
POTASSIUM SERPL-SCNC: 4.3 MMOL/L (ref 3.5–5.1)
Q-T INTERVAL, ECG07: 350 MS
QRS DURATION, ECG06: 84 MS
QTC CALCULATION (BEZET), ECG08: 432 MS
RBC # BLD AUTO: 4.39 M/UL (ref 3.8–5.2)
SODIUM SERPL-SCNC: 140 MMOL/L (ref 136–145)
VENTRICULAR RATE, ECG03: 92 BPM
WBC # BLD AUTO: 8.9 K/UL (ref 3.6–11)

## 2019-06-20 PROCEDURE — 83735 ASSAY OF MAGNESIUM: CPT

## 2019-06-20 PROCEDURE — 85027 COMPLETE CBC AUTOMATED: CPT

## 2019-06-20 PROCEDURE — 65660000000 HC RM CCU STEPDOWN

## 2019-06-20 PROCEDURE — 80048 BASIC METABOLIC PNL TOTAL CA: CPT

## 2019-06-20 PROCEDURE — 74011250637 HC RX REV CODE- 250/637: Performed by: NURSE PRACTITIONER

## 2019-06-20 PROCEDURE — 93005 ELECTROCARDIOGRAM TRACING: CPT

## 2019-06-20 PROCEDURE — 36415 COLL VENOUS BLD VENIPUNCTURE: CPT

## 2019-06-20 PROCEDURE — 74011250637 HC RX REV CODE- 250/637: Performed by: SPECIALIST

## 2019-06-20 RX ORDER — FLUTICASONE PROPIONATE 50 MCG
2 SPRAY, SUSPENSION (ML) NASAL DAILY
Status: DISCONTINUED | OUTPATIENT
Start: 2019-06-21 | End: 2019-06-23 | Stop reason: HOSPADM

## 2019-06-20 RX ORDER — TOPIRAMATE 25 MG/1
50 TABLET ORAL 3 TIMES DAILY
Status: DISCONTINUED | OUTPATIENT
Start: 2019-06-20 | End: 2019-06-20

## 2019-06-20 RX ORDER — PANTOPRAZOLE SODIUM 40 MG/1
40 TABLET, DELAYED RELEASE ORAL
Status: DISCONTINUED | OUTPATIENT
Start: 2019-06-21 | End: 2019-06-23 | Stop reason: HOSPADM

## 2019-06-20 RX ORDER — DOFETILIDE 0.25 MG/1
250 CAPSULE ORAL EVERY 12 HOURS
Status: DISCONTINUED | OUTPATIENT
Start: 2019-06-20 | End: 2019-06-20

## 2019-06-20 RX ORDER — DILTIAZEM HYDROCHLORIDE 30 MG/1
30 TABLET, FILM COATED ORAL
Status: DISCONTINUED | OUTPATIENT
Start: 2019-06-20 | End: 2019-06-23

## 2019-06-20 RX ORDER — METHOCARBAMOL 500 MG/1
500 TABLET, FILM COATED ORAL 2 TIMES DAILY
COMMUNITY

## 2019-06-20 RX ORDER — CLOMIPRAMINE HYDROCHLORIDE 25 MG/1
50 CAPSULE ORAL EVERY EVENING
Status: DISCONTINUED | OUTPATIENT
Start: 2019-06-20 | End: 2019-06-20

## 2019-06-20 RX ORDER — HYDROXYZINE 25 MG/1
25 TABLET, FILM COATED ORAL
Status: DISCONTINUED | OUTPATIENT
Start: 2019-06-20 | End: 2019-06-20

## 2019-06-20 RX ORDER — ESCITALOPRAM OXALATE 10 MG/1
20 TABLET ORAL DAILY
Status: DISCONTINUED | OUTPATIENT
Start: 2019-06-21 | End: 2019-06-20

## 2019-06-20 RX ORDER — HYDROCORTISONE 10 MG/1
10 TABLET ORAL DAILY
Status: DISCONTINUED | OUTPATIENT
Start: 2019-06-20 | End: 2019-06-23 | Stop reason: HOSPADM

## 2019-06-20 RX ORDER — MONTELUKAST SODIUM 10 MG/1
10 TABLET ORAL DAILY
Status: DISCONTINUED | OUTPATIENT
Start: 2019-06-21 | End: 2019-06-23 | Stop reason: HOSPADM

## 2019-06-20 RX ORDER — METHOCARBAMOL 500 MG/1
500 TABLET, FILM COATED ORAL
Status: DISCONTINUED | OUTPATIENT
Start: 2019-06-20 | End: 2019-06-23 | Stop reason: HOSPADM

## 2019-06-20 RX ORDER — CLONAZEPAM 0.5 MG/1
0.5 TABLET ORAL
Status: DISCONTINUED | OUTPATIENT
Start: 2019-06-20 | End: 2019-06-23 | Stop reason: HOSPADM

## 2019-06-20 RX ORDER — ACETAMINOPHEN 325 MG/1
650 TABLET ORAL
Status: DISCONTINUED | OUTPATIENT
Start: 2019-06-20 | End: 2019-06-23 | Stop reason: HOSPADM

## 2019-06-20 RX ORDER — CETIRIZINE HCL 10 MG
10 TABLET ORAL DAILY
Status: DISCONTINUED | OUTPATIENT
Start: 2019-06-21 | End: 2019-06-23 | Stop reason: HOSPADM

## 2019-06-20 RX ORDER — DOFETILIDE 0.25 MG/1
500 CAPSULE ORAL EVERY 12 HOURS
Status: DISCONTINUED | OUTPATIENT
Start: 2019-06-20 | End: 2019-06-21

## 2019-06-20 RX ORDER — LANOLIN ALCOHOL/MO/W.PET/CERES
400 CREAM (GRAM) TOPICAL DAILY
Status: DISCONTINUED | OUTPATIENT
Start: 2019-06-21 | End: 2019-06-20

## 2019-06-20 RX ORDER — HYDROXYZINE 25 MG/1
25 TABLET, FILM COATED ORAL
Status: DISCONTINUED | OUTPATIENT
Start: 2019-06-20 | End: 2019-06-23 | Stop reason: HOSPADM

## 2019-06-20 RX ORDER — HYDROCORTISONE 10 MG/1
20 TABLET ORAL 3 TIMES DAILY
COMMUNITY
End: 2020-09-01

## 2019-06-20 RX ORDER — HYDROCORTISONE 10 MG/1
10 TABLET ORAL DAILY
Status: DISCONTINUED | OUTPATIENT
Start: 2019-06-21 | End: 2019-06-20

## 2019-06-20 RX ORDER — CELECOXIB 100 MG/1
200 CAPSULE ORAL 2 TIMES DAILY
Status: DISCONTINUED | OUTPATIENT
Start: 2019-06-20 | End: 2019-06-23 | Stop reason: HOSPADM

## 2019-06-20 RX ORDER — ACETAMINOPHEN AND CODEINE PHOSPHATE 120; 12 MG/5ML; MG/5ML
0.35 SOLUTION ORAL
Status: DISCONTINUED | OUTPATIENT
Start: 2019-06-20 | End: 2019-06-23 | Stop reason: HOSPADM

## 2019-06-20 RX ORDER — HYDROCORTISONE 10 MG/1
20 TABLET ORAL
COMMUNITY

## 2019-06-20 RX ORDER — ESCITALOPRAM OXALATE 10 MG/1
10 TABLET ORAL DAILY
Status: DISCONTINUED | OUTPATIENT
Start: 2019-06-21 | End: 2019-06-20

## 2019-06-20 RX ORDER — HYDROCORTISONE 10 MG/1
20 TABLET ORAL
Status: DISCONTINUED | OUTPATIENT
Start: 2019-06-21 | End: 2019-06-23 | Stop reason: HOSPADM

## 2019-06-20 RX ORDER — DOFETILIDE 0.25 MG/1
500 CAPSULE ORAL EVERY 12 HOURS
Status: DISCONTINUED | OUTPATIENT
Start: 2019-06-20 | End: 2019-06-20

## 2019-06-20 RX ORDER — CELECOXIB 200 MG/1
200 CAPSULE ORAL 2 TIMES DAILY
COMMUNITY

## 2019-06-20 RX ORDER — ZOLPIDEM TARTRATE 5 MG/1
5 TABLET ORAL
Status: DISCONTINUED | OUTPATIENT
Start: 2019-06-20 | End: 2019-06-23 | Stop reason: HOSPADM

## 2019-06-20 RX ADMIN — ACETAMINOPHEN 650 MG: 325 TABLET ORAL at 15:16

## 2019-06-20 RX ADMIN — CELECOXIB 200 MG: 100 CAPSULE ORAL at 18:37

## 2019-06-20 RX ADMIN — DILTIAZEM HYDROCHLORIDE 30 MG: 30 TABLET, FILM COATED ORAL at 16:56

## 2019-06-20 RX ADMIN — HYDROCORTISONE 10 MG: 10 TABLET ORAL at 15:48

## 2019-06-20 RX ADMIN — ACETAMINOPHEN AND CODEINE PHOSPHATE 0.35 MG: 120; 12 SOLUTION ORAL at 22:14

## 2019-06-20 RX ADMIN — DILTIAZEM HYDROCHLORIDE 30 MG: 30 TABLET, FILM COATED ORAL at 22:14

## 2019-06-20 RX ADMIN — HYDROXYZINE HYDROCHLORIDE 25 MG: 25 TABLET, FILM COATED ORAL at 22:14

## 2019-06-20 RX ADMIN — ACETAMINOPHEN 650 MG: 325 TABLET ORAL at 20:09

## 2019-06-20 RX ADMIN — METHOCARBAMOL TABLETS 500 MG: 500 TABLET, COATED ORAL at 18:42

## 2019-06-20 RX ADMIN — DOFETILIDE 500 MCG: 0.25 CAPSULE ORAL at 18:37

## 2019-06-20 NOTE — PROGRESS NOTES
Reason for Admission:                      RRAT Score:        0             Plan for utilizing home health:   Not recommended at this time. Current Advanced Directive/Advance Care Plan: AD not on file                          Transition of Care Plan:    I met with the patient, she stated that she lives with her significant other. Her mother is her main contact Nicole Crowder [phone]. Pt stated that she has prescription coverage under her insurance plan. She gets her prescriptions filled at [facility] in [address] [facility]. Pt does not have any DME. Patient is not interested in obtaining a PCP. Patient does not have any DME. No other issues or concerns at this time. I will continue to follow and assist with discharge planning. JAMIE Hernandez  Care Management Interventions  PCP Verified by CM: No  MyChart Signup: No  Discharge Durable Medical Equipment: No  Health Maintenance Reviewed: Yes  Physical Therapy Consult: No  Occupational Therapy Consult: No  Speech Therapy Consult: No  Current Support Network:  Other  Plan discussed with Pt/Family/Caregiver: Yes  Discharge Location  Discharge Placement: Home

## 2019-06-20 NOTE — H&P
Cardiology H&P Note          [address], [place], [address] [facility]         Phone [phone]; Fax [phone]            2019  9:17 AM  None  :  1990   MRN:  [id number]         Admission Diagnosis: Visit for monitoring Tikosyn therapy [Z51.81, Z79.899]    ASSESSMENT/RECOMMENDATIONS:   SVT/VT: will start Tikosyn at 250 mcg BID, ECG shows normal QTc  Keep K @4 and Mag @ 2. -ECG 2 hours  Post every dose to monitor QTc  - d/c clomipramine  - can continue Lexapro - no drug interactions with Tikosyn   - she is on Atarax low dose qhs for sleep- monitoring QTc closely   -no longer taking Topamax/ clomipramine/lexapro- but took a  Dose  Of lexapro this AM?  - reviewed the importance of not stopping and starting medications (especially psychiatric medications without first taking to her provider)    - noted brief run of SVT while sleeping and getting to use the restroom - will hold on adding AV susan agents until be see how she tolerates the tikosyn     Syncope/near syncope   POTS    Bipolar d/o: cont lexapro at lower dose, will discuss getting psych consult w d/c of several medications   Adrenal insufficiency: steroids PTA BID   Charcot Yesi Tooth disease  Arthritis: takes celebrex   Allergies: singular/zyrtec/flonase       I have reviewed medications & drug interaction with the patient & pharmacist extensively. Time spent >45 minutes    Addendum: d/w DR Froylan Mahmood since she has been on lexapro/topamax and clomipramine for 3 weeks now. Will start tikosyn 500 mcg BID  And dc lexapro   Awaiting further recommendations from psych        Thelma Prom is a 30 y.o. female  with CMT, IST/POTS presenting after being diagnosed with secondary adrenal insufficiency recently. She had overall noticed acceptable HR's on her Fitbit however had episodes where her HR elevated and resulted in fatigue.  These episodes were usually being driven by pain, etc however she noticed her HR was slow in de-escalating.  She was continued on a as needed diltiazem regimen.  However she was subsequently hospitalized in [address] [place] [address] with progression of her secondary adrenal insufficiency.  While in the emergency room she experienced 1 of her clinical \"disconnects\" where he became unresponsive.  She reported ER staff stating she was exhibiting recurrent episodes of ventricular tachycardia at the time.  Strips of this were not available for review.  She was back to taking diltiazem daily. She underwent injection of a loop recorder to monitor for ventricular arrhythmias. ILR thus far reveals only sinus tachycardia with oversensing. She triggered the ILR once (though not for her full blown clinical episode) during which SR was observed. She is here for Tikosyn load today. The patient was on several psychiatric medications for her OCD/bipolar disorder PTA. She \"weaned herself\" off of the topamax & clomipramine about 1 month ago. She recently stopped the  lexapro as well  (but took one lexapro this AM) She stopped all of these medications because she was concerned about potential interactions with the Tikosyn. She is aware atarax has a potential interaction- but only take 1/night to help her sleep. She would like to be on the clomipramine if possible.          Allergies   Allergen Reactions    Benadryl [Diphenhydramine Hcl] Other (comments)     hallucinations    Diphenhydramine Other (comments)    Hydromorphone Other (comments)    Morphine Shortness of Breath    Percocet [Oxycodone-Acetaminophen] Itching         Past Medical History:   Diagnosis Date    Adrenal insufficiency (HCC)     Asthma     Charcot-Yesi-Tooth disease     Orthostatic hypotension     Other ill-defined conditions(755.64)     neurologicaly induced CMT    Psychiatric disorder     bipolar        Past Surgical History:   Procedure Laterality Date    HX ORTHOPAEDIC      bilat feet    [place] INSERTION SUBQ CARDIAC RHYTHM MONITOR W/PRGRMG N/A 2019    LOOP RECORDER INSERT performed by Hermilo Brown MD at [facility]        . Home Medications:  Prior to Admission Medications   Prescriptions Last Dose Informant Patient Reported? Taking? BREO ELLIPTA 200-25 mcg/dose inhaler   Yes No   Sig: Take 1 Puff by inhalation daily. Cetirizine (ZYRTEC) 10 mg cap   Yes No   Sig: Take 10 mg by mouth daily. LAMOTRIGINE (LAMICTAL PO)   Yes No   Sig: Take 100 mg by mouth every other day. NORETHINDRONE (JOLIVETTE PO)   Yes No   Sig: Take 0.35 mg by mouth daily. PROAIR HFA 90 mcg/actuation inhaler   Yes No   Sig: INHALE 2 PUFFS PO Q 4 TO 6 H PRN AND 10-15 MINUTES BEFORE EXERCISE   VYVANSE 20 mg capsule   Yes No   Si mg once daily   clomiPRAMINE (ANAFRANIL) 50 mg capsule   Yes No   Sig: Take 50 mg by mouth nightly. clonazePAM (KLONOPIN) 0.5 mg tablet   Yes No   Sig: Take 0.5 mg by mouth three (3) times daily as needed. dilTIAZem CD (CARDIZEM CD) 120 mg ER capsule   No No   Sig: Take 1 tablet per day if needed for fast heart rate. Maximum dosage - 1 capsule every 24 hours. escitalopram oxalate (LEXAPRO) 20 mg tablet   Yes No   Sig: Take 20 mg by mouth daily. fluticasone (FLONASE) 50 mcg/actuation nasal spray   Yes No   Si Sprays by Both Nostrils route daily. hydrOXYzine HCl (ATARAX) 25 mg tablet   Yes No   Sig: Take 25 mg by mouth nightly. hydrocortisone (CORTEF) 10 mg tablet   No No   Si tablet PO in AM and 1/2 tablet 5 PM   Patient taking differently: 2  tablets PO in AM and 1 tablet 5 PM   hydrocortisone sodium succ/PF (SOLU-CORTEF, PF,) 100 mg/2 mL solr (Act-O-Vial)   No No   Si mg intramuscular once in the emergency   ibuprofen (MOTRIN) 800 mg tablet   Yes No   Sig: TK 1 T PO BID   magnesium 250 mg tab   Yes No   Sig: Take  by mouth daily. montelukast (SINGULAIR) 10 mg tablet   Yes No   Sig: Take 10 mg by mouth daily.    olopatadine (PATADAY) 0.2 % drop ophthalmic solution   Yes No   Sig: Administer 1 Drop to both eyes as needed. omeprazole (PRILOSEC) 40 mg capsule   Yes No   Sig: Take 40 mg by mouth daily. phosphorus (VIRT-PHOS 250 NEUTRAL) 250 mg tablet   Yes No   Sig: Take 1 Tab by mouth three (3) times daily. topiramate (TOPAMAX) 50 mg tablet   Yes No   Sig: Take 50 mg by mouth three (3) times daily. Facility-Administered Medications: None       Hospital Medications:  Current Facility-Administered Medications   Medication Dose Route Frequency    topiramate (TOPAMAX) tablet 50 mg  50 mg Oral TID    [START ON 6/21/2019] pantoprazole (PROTONIX) tablet 40 mg  40 mg Oral ACB    [START ON 6/21/2019] . PHARMACY TO SUBSTITUTE PER PROTOCOL (Reordered from: NORETHINDRONE (JOLIVETTE PO))    Per Protocol    phosphorus (K PHOS NEUTRAL) 250 mg tablet 1 Tab  1 Tab Oral TID    [START ON 6/21/2019] magnesium oxide (MAG-OX) tablet 400 mg  400 mg Oral DAILY    [START ON 6/21/2019] montelukast (SINGULAIR) tablet 10 mg  10 mg Oral DAILY    clonazePAM (KlonoPIN) tablet 0.5 mg  0.5 mg Oral TID PRN    dofetilide (TIKOSYN) capsule 500 mcg  500 mcg Oral Q12H    Tikosyn: EKG reminder note. Please document Qtc on MAR 2 hours post Tikosyn dose   1 Each Other Q12H          OBJECTIVE       Laboratory and Imaging have been reviewed and are notable for      ECG: NSR HR 92 bpm   msec      Telemetry: NSR with brief runs of 's     Diagnostic Tests:     No results for input(s): CPK, CKMB, TROIQ in the last 72 hours. No lab exists for component: CKQMB, CPKMB  Recent Labs     06/20/19  1005      K 4.3   CO2 29   BUN 17   CREA 0.77   *   MG 2.2   WBC 8.9   HGB 11.9   HCT 39.1            Cardiac work up to date:  1) Echocardiogram  (9/21/12):  Normal EF    2) Cholesterol    3) 2/8/2019: [employer] Loop Recorder Injection per Dr. Grace Almonte                   Social History:  Social History     Tobacco Use    Smoking status: Never Smoker    Smokeless tobacco: Never Used   Substance Use Topics    Alcohol use: No     Alcohol/week: 0.0 oz       Family History:  Family History   Problem Relation Age of Onset    Cancer Mother     Alcohol abuse Maternal Aunt     Diabetes Maternal Grandmother     Hypertension Maternal Grandmother     Cancer Paternal Grandmother     Diabetes Paternal Grandmother     Alcohol abuse Paternal Grandfather        Review of Symptoms:  A comprehensive review of systems was negative except for that written in the HPI. Physical Exam:      Visit Vitals  /83   Pulse (!) 108   Temp 98.2 °F (36.8 °C)   Resp 20   Ht 5' (1.524 m)   Wt 223 lb 12.8 oz (101.5 kg)   SpO2 100%   BMI 43.71 kg/m²     General Appearance:  Well developed, obese ,alert and oriented x 3, and individual in no acute distress. EENT/Mouth:   Hearing grossly normal.Normal oral mucosa,no scleral icterus     Neck: Supple no JVD    Chest:   Lungs clear to auscultation bilaterally,no rales rhonchi or wheezing   Cardiovascular:  Regular rate and rhythm, no  Murmur, no rubs or gallop. Abdomen:   Soft, non-tender, bowel sounds are active. Extremities: No edema bilaterally. Pulses detected, no varicosities   Skin: Warm and dry. No bruising  Neuro  Moves all extermities and neurologically intact                                                       I have discussed the diagnosis with the patient and the intended plan as seen in the above orders. Questions were answered concerning future plans. I have discussed medication side effects and warnings with the patient as well. Edith Parikh is in agreement to the plan listed above and wishes to proceed. Thank you for this consult.       Kasie Davies NP

## 2019-06-20 NOTE — PROGRESS NOTES
[facility]: MED RECONCILIATION    Comments/Recommendations:    Med rec performed via interview with patient who was a good historian.  Patient states she weaned herself off of several medications recently:  1. Clomipramine 75 mg po QHS: patient states she stopped approximately 1 month ago; per micromedex, major drug interaction exists between clomipramine and tikosyn regarding potential for additive effects on the QT interval and increased risk of serious cardiovascular effects. \"Coadministration of dofetilide with other drugs that may prolong the QT interval, such as clomiPRAMINE, is not recommended due to the potential for additive effects on the QT interval and increased risk of serious cardiovascular effects (Prod Info TIKOSYN® oral capsules, 2011). If coadministration is required, monitor for QT interval prolongation. \"    2. Topiramate 50 mg po TID: patient states she stopped 1 month ago. No interaction with Tikosyn noted on Micromedex    3. Lexapro 20 mg po daily: patient states she stopped taking 3 weeks ago but took 1 tablet this morning; Per micromedex, major drug interaction exists with tikosyn regarding increased risk of Qtc prolongation. \"Escitalopram is a BO-puxvictw-vtifhvvsmr drug (Prod Info Lexapro® oral tablets, oral solution, 2012). Use caution with concurrent use of other UP-wvcsonyk-ygqnkvavup agents, due to increased risk of additive QT-interval prolongation. \"    4. Vyvanse 20 mg po daily: patient states she ran out 1 week ago but had one tablet left so she took it today. It is noted that increased heart rate may occur with an incidence of 2-7% per lexicomp. \"Cardiovascular disorders: CNS stimulants may increase heart rate and blood pressure; the observed mean increase in heart rate was 3 to 6 bpm and blood pressure was 2 to 4 mm Hg.  Use with caution in patients with hypertension, heart failure, recent MI, ventricular arrhythmia, and other cardiovascular conditions that might be exacerbated by increases in blood pressure or heart rate. \"--Lexicomp. It is also note to use caution in patients with psychatric disorders. \"Psychiatric disorders: Use with caution in patients with preexisting psychosis or bipolar disorder (may induce mixed/manic episode). May exacerbate symptoms of behavior and thought disorder in psychotic patients; new onset psychosis or viky may occur in children or adolescents with stimulant use. Patients should be screened for bipolar disorder and risk factors for developing viky prior to treatment; consider discontinuation if such symptoms (eg, delusional thinking, hallucinations, or viky) occur. May be associated with aggressive behavior or hostility (causal relationship not established); monitor for development or worsening of these behaviors. \"     5. Lamictal 100 mg po qhs: patient states she stopped taking weeks ago; per micromedex: major drug interaction exists between Tikosyn and lamictal resulting in increased tikosyn levels. Do not recommend combination. \"Coadministration of lamotrigine (an organic cationic transporter 2 [OCT2] inhibitor) with OCT2 substrates with a narrow therapeutic index is not recommended due to the risk of greater OCT2 substrate exposure (Prod Info LAMICTAL® oral tablets, oral chewable dispersible tablets, 2014). \"    · It is also noted that hydroxyzine and tikosyn have a drug interaction. Per micromedex \"Hydroxyzine has been associated with QT interval prolongation and development of Torsade de Pointes. Caution is advised if hydroxyzine is used concomitantly with this drug as additive effects on QT prolongation may occur (Prod Info hydroxyzine HCl oral tablets , 2016). Consider close ECG monitoring at baseline and during concurrent therapy with QT-interval prolonging drugs. \"    · Recommend psych consult as patient would like to resume some of her medications as she has not felt well since stopping them.  Consider restarting at lower doses as patient has stopped most medications several weeks ago. · Confirmed patient is no longer taking gabapentin, breo ellipta, ergocalciferol, ibuprofen, or diltiazem. Information obtained from: patient, rx query    Significant PMH/Disease States:   Past Medical History:   Diagnosis Date    Adrenal insufficiency ([address]     Asthma     Charcot-Yesi-Tooth disease     Orthostatic hypotension     Other ill-defined conditions(799.89)     neurologicaly induced CMT    Psychiatric disorder     bipolar     Allergies: Benadryl [diphenhydramine hcl]; Diphenhydramine; Hydromorphone; Morphine; and Percocet [oxycodone-acetaminophen]    Prior to Admission Medications:     Prior to Admission Medications   Prescriptions Last Dose Informant Patient Reported? Taking? Cetirizine (ZYRTEC) 10 mg cap 2019 at Unknown time Self Yes Yes   Sig: Take 10 mg by mouth daily. NORETHINDRONE (JOLIVETTE PO) 2019 at PM Self Yes Yes   Sig: Take 0.35 mg by mouth nightly. PROAIR HFA 90 mcg/actuation inhaler  Self Yes Yes   Sig: INHALE 2 PUFFS PO Q 4 TO 6 H PRN AND 10-15 MINUTES BEFORE EXERCISE   VYVANSE 20 mg capsule 2019 at Unknown time Self Yes Yes   Si mg once daily   celecoxib (CELEBREX) 200 mg capsule 2019 at PM Self Yes Yes   Sig: Take 200 mg by mouth two (2) times a day. Indications: Joint Damage causing Pain and Loss of Function   clonazePAM (KLONOPIN) 0.5 mg tablet 2019 at  0800 Self Yes Yes   Sig: Take 0.5 mg by mouth three (3) times daily as needed. escitalopram oxalate (LEXAPRO) 20 mg tablet 2019 at AM Self Yes Yes   Sig: Take 20 mg by mouth daily. fluticasone (FLONASE) 50 mcg/actuation nasal spray 2019 at Unknown time Self Yes Yes   Si Sprays by Both Nostrils route daily. hydrOXYzine HCl (ATARAX) 25 mg tablet 2019 at PM Self Yes Yes   Sig: Take 25 mg by mouth nightly.    hydrocortisone (CORTEF) 10 mg tablet 2019 at Unknown time Self No Yes   Si tablet PO in AM and 1/2 tablet 5 PM   Patient taking differently: 2  tablets PO in AM and 1 tablet 5 PM   magnesium 250 mg tab  Self Yes Yes   Sig: Take 250 mg by mouth daily as needed (muscle spasms). methocarbamol (ROBAXIN) 500 mg tablet  Self Yes Yes   Sig: Take 500 mg by mouth two (2) times a day. montelukast (SINGULAIR) 10 mg tablet 6/19/2019 at Unknown time Self Yes Yes   Sig: Take 10 mg by mouth daily. olopatadine (PATADAY) 0.2 % drop ophthalmic solution 5/20/2019 at Unknown time Self Yes Yes   Sig: Administer 1 Drop to both eyes daily as needed (itching eyes). omeprazole (PRILOSEC) 40 mg capsule 6/19/2019 at Unknown time Self Yes Yes   Sig: Take 40 mg by mouth Daily (before breakfast).       Facility-Administered Medications: None                    DEE DEE Weeks   Contact: [phone]

## 2019-06-20 NOTE — PROGRESS NOTES
5194 Bedside and Verbal shift change report given to [address] (oncoming nurse) by Janis Strickland RN (offgoing nurse). Report included the following information SBAR, MAR and Cardiac Rhythm Sinus Tach. 1030 Pt resting    1130 Loraine aware 's and 140-160's. Not sustaining, will receive Tikosyn at 1800 today. 1230 Pharmacist at bedside to reconcile meds; canceled phosphorus and Topamax    1837 Tikosyn given/ need EKG at 2037 and document QTC    1945 Bedside and Verbal shift change report given to [facility] (oncoming nurse) by [facility] (offgoing nurse). Report included the following information SBAR, Kardex, Recent Results and Cardiac Rhythm sinus tach.

## 2019-06-20 NOTE — PROGRESS NOTES
Patient direct admit from Dr. Ching Garcia for Tikosyn loading. EKG completed. Phlebotomy to draw ordered labs prior to Cardiology ordering start of medication.

## 2019-06-21 LAB
ANION GAP SERPL CALC-SCNC: 6 MMOL/L (ref 5–15)
ANION GAP SERPL CALC-SCNC: 7 MMOL/L (ref 5–15)
ATRIAL RATE: 105 BPM
ATRIAL RATE: 88 BPM
BUN SERPL-MCNC: 13 MG/DL (ref 6–20)
BUN SERPL-MCNC: 14 MG/DL (ref 6–20)
BUN/CREAT SERPL: 16 (ref 12–20)
BUN/CREAT SERPL: 17 (ref 12–20)
CALCIUM SERPL-MCNC: 8.5 MG/DL (ref 8.5–10.1)
CALCIUM SERPL-MCNC: 8.7 MG/DL (ref 8.5–10.1)
CALCULATED P AXIS, ECG09: 51 DEGREES
CALCULATED P AXIS, ECG09: 56 DEGREES
CALCULATED R AXIS, ECG10: 43 DEGREES
CALCULATED R AXIS, ECG10: 61 DEGREES
CALCULATED T AXIS, ECG11: -14 DEGREES
CALCULATED T AXIS, ECG11: 12 DEGREES
CHLORIDE SERPL-SCNC: 107 MMOL/L (ref 97–108)
CHLORIDE SERPL-SCNC: 110 MMOL/L (ref 97–108)
CO2 SERPL-SCNC: 25 MMOL/L (ref 21–32)
CO2 SERPL-SCNC: 27 MMOL/L (ref 21–32)
CREAT SERPL-MCNC: 0.81 MG/DL (ref 0.55–1.02)
CREAT SERPL-MCNC: 0.81 MG/DL (ref 0.55–1.02)
DIAGNOSIS, 93000: NORMAL
DIAGNOSIS, 93000: NORMAL
GLUCOSE BLD STRIP.AUTO-MCNC: 197 MG/DL (ref 65–100)
GLUCOSE SERPL-MCNC: 113 MG/DL (ref 65–100)
GLUCOSE SERPL-MCNC: 142 MG/DL (ref 65–100)
MAGNESIUM SERPL-MCNC: 2.3 MG/DL (ref 1.6–2.4)
P-R INTERVAL, ECG05: 130 MS
P-R INTERVAL, ECG05: 130 MS
POTASSIUM SERPL-SCNC: 3.7 MMOL/L (ref 3.5–5.1)
POTASSIUM SERPL-SCNC: 4.2 MMOL/L (ref 3.5–5.1)
Q-T INTERVAL, ECG07: 358 MS
Q-T INTERVAL, ECG07: 390 MS
QRS DURATION, ECG06: 84 MS
QRS DURATION, ECG06: 88 MS
QTC CALCULATION (BEZET), ECG08: 471 MS
QTC CALCULATION (BEZET), ECG08: 473 MS
SERVICE CMNT-IMP: ABNORMAL
SODIUM SERPL-SCNC: 140 MMOL/L (ref 136–145)
SODIUM SERPL-SCNC: 142 MMOL/L (ref 136–145)
VENTRICULAR RATE, ECG03: 105 BPM
VENTRICULAR RATE, ECG03: 88 BPM

## 2019-06-21 PROCEDURE — 83735 ASSAY OF MAGNESIUM: CPT

## 2019-06-21 PROCEDURE — 74011250637 HC RX REV CODE- 250/637: Performed by: NURSE PRACTITIONER

## 2019-06-21 PROCEDURE — 74011250636 HC RX REV CODE- 250/636: Performed by: INTERNAL MEDICINE

## 2019-06-21 PROCEDURE — 80048 BASIC METABOLIC PNL TOTAL CA: CPT

## 2019-06-21 PROCEDURE — 74011250637 HC RX REV CODE- 250/637: Performed by: SPECIALIST

## 2019-06-21 PROCEDURE — 93005 ELECTROCARDIOGRAM TRACING: CPT

## 2019-06-21 PROCEDURE — 74011250636 HC RX REV CODE- 250/636: Performed by: NURSE PRACTITIONER

## 2019-06-21 PROCEDURE — 65660000000 HC RM CCU STEPDOWN

## 2019-06-21 PROCEDURE — 82962 GLUCOSE BLOOD TEST: CPT

## 2019-06-21 PROCEDURE — 36415 COLL VENOUS BLD VENIPUNCTURE: CPT

## 2019-06-21 PROCEDURE — 74011250637 HC RX REV CODE- 250/637: Performed by: INTERNAL MEDICINE

## 2019-06-21 RX ORDER — FLUVOXAMINE MALEATE 50 MG/1
50 TABLET ORAL
Status: DISCONTINUED | OUTPATIENT
Start: 2019-06-21 | End: 2019-06-23 | Stop reason: HOSPADM

## 2019-06-21 RX ORDER — POTASSIUM CHLORIDE 750 MG/1
40 TABLET, FILM COATED, EXTENDED RELEASE ORAL
Status: ACTIVE | OUTPATIENT
Start: 2019-06-21 | End: 2019-06-21

## 2019-06-21 RX ORDER — RISPERIDONE 1 MG/1
0.5 TABLET, FILM COATED ORAL 2 TIMES DAILY
Status: DISCONTINUED | OUTPATIENT
Start: 2019-06-21 | End: 2019-06-23 | Stop reason: HOSPADM

## 2019-06-21 RX ORDER — POTASSIUM CHLORIDE 1.5 G/1.77G
40 POWDER, FOR SOLUTION ORAL
Status: COMPLETED | OUTPATIENT
Start: 2019-06-21 | End: 2019-06-21

## 2019-06-21 RX ORDER — DOFETILIDE 0.25 MG/1
250 CAPSULE ORAL EVERY 12 HOURS
Status: DISCONTINUED | OUTPATIENT
Start: 2019-06-21 | End: 2019-06-23

## 2019-06-21 RX ADMIN — DOFETILIDE 250 MCG: 0.25 CAPSULE ORAL at 21:00

## 2019-06-21 RX ADMIN — HYDROXYZINE HYDROCHLORIDE 25 MG: 25 TABLET, FILM COATED ORAL at 21:00

## 2019-06-21 RX ADMIN — FLUTICASONE PROPIONATE 2 SPRAY: 50 SPRAY, METERED NASAL at 11:15

## 2019-06-21 RX ADMIN — METHYLPREDNISOLONE SODIUM SUCCINATE 40 MG: 40 INJECTION, POWDER, FOR SOLUTION INTRAMUSCULAR; INTRAVENOUS at 11:36

## 2019-06-21 RX ADMIN — CELECOXIB 200 MG: 100 CAPSULE ORAL at 09:29

## 2019-06-21 RX ADMIN — CELECOXIB 200 MG: 100 CAPSULE ORAL at 17:06

## 2019-06-21 RX ADMIN — CETIRIZINE HYDROCHLORIDE 10 MG: 10 TABLET, FILM COATED ORAL at 09:29

## 2019-06-21 RX ADMIN — DILTIAZEM HYDROCHLORIDE 30 MG: 30 TABLET, FILM COATED ORAL at 11:15

## 2019-06-21 RX ADMIN — DOFETILIDE 250 MCG: 0.25 CAPSULE ORAL at 09:29

## 2019-06-21 RX ADMIN — RISPERIDONE 0.5 MG: 1 TABLET ORAL at 21:00

## 2019-06-21 RX ADMIN — ACETAMINOPHEN 650 MG: 325 TABLET ORAL at 21:00

## 2019-06-21 RX ADMIN — ACETAMINOPHEN 650 MG: 325 TABLET ORAL at 00:04

## 2019-06-21 RX ADMIN — DILTIAZEM HYDROCHLORIDE 30 MG: 30 TABLET, FILM COATED ORAL at 17:06

## 2019-06-21 RX ADMIN — MONTELUKAST SODIUM 10 MG: 10 TABLET, FILM COATED ORAL at 09:29

## 2019-06-21 RX ADMIN — ACETAMINOPHEN AND CODEINE PHOSPHATE 0.35 MG: 120; 12 SOLUTION ORAL at 21:09

## 2019-06-21 RX ADMIN — FLUVOXAMINE MALEATE 50 MG: 50 TABLET, COATED ORAL at 21:09

## 2019-06-21 RX ADMIN — POTASSIUM CHLORIDE 40 MEQ: 1.5 POWDER, FOR SOLUTION ORAL at 07:32

## 2019-06-21 RX ADMIN — HYDROCORTISONE 10 MG: 10 TABLET ORAL at 14:40

## 2019-06-21 RX ADMIN — METHOCARBAMOL TABLETS 500 MG: 500 TABLET, COATED ORAL at 14:40

## 2019-06-21 RX ADMIN — HYDROCORTISONE 20 MG: 10 TABLET ORAL at 06:43

## 2019-06-21 RX ADMIN — DILTIAZEM HYDROCHLORIDE 30 MG: 30 TABLET, FILM COATED ORAL at 06:43

## 2019-06-21 RX ADMIN — PANTOPRAZOLE SODIUM 40 MG: 40 TABLET, DELAYED RELEASE ORAL at 06:43

## 2019-06-21 RX ADMIN — DILTIAZEM HYDROCHLORIDE 30 MG: 30 TABLET, FILM COATED ORAL at 21:00

## 2019-06-21 RX ADMIN — SODIUM CHLORIDE 500 ML: 900 INJECTION, SOLUTION INTRAVENOUS at 10:38

## 2019-06-21 NOTE — PROGRESS NOTES
Potassium resulted at 3.7 this am.  I ordered 40  mEq of KCl to be given now, per protocol, with a recheck 2 hours after dose.

## 2019-06-21 NOTE — PROGRESS NOTES
Bedside and Verbal shift change report given to Hector Lorenzo (oncoming nurse) by Rodrick Aldana (offgoing nurse). Report included the following information SBAR, Kardex, Procedure Summary, Intake/Output, MAR, Recent Results and Cardiac Rhythm nsr, sinus tach. 0600 tikosyn dose held per MD, EKG does not need to be dose. Order for 40 mEq K to be given now, recheck BMP in 2 hours approx 0930. Consult to psych noted     0506 tikosyn dose decreased, order to repeat EKG 2 hours post. Per Clara Abdi hold additional dose K as pt has already received 40 mEq this am    0930 blood sent to lab    1026 pt unresponsive in chair, code blue called, pt never lost pulse, VSS. Dr Julee Alvarado, ICU RN at bedside. EKG performed, 500 mL bolus given. Order for one time dose solumedrol 40 mg ordered. Per MD pt has POTS, pt came around slowly, states that this has happened before (she was able to hear everything happening, could not respond). 1310 called by tele, pt HR sustaining in 130s, pt was laughing with friend, asymptomatic    1441 oprn robaxin given for c/o muscle spasms    1600 Emi from psych at bedside    1626 order to start risperdal 0.5 mg BID, Luvox 50 mg at bedtime    Bedside and Verbal shift change report given to Rodrick Aldana (oncoming nurse) by Hector Lorenzo (offgoing nurse). Report included the following information SBAR, Kardex, ED Summary, Procedure Summary, Intake/Output, MAR, Recent Results and Cardiac Rhythm nsr, sinus tach.

## 2019-06-21 NOTE — PROGRESS NOTES
1900  Bedside and Verbal shift change report given to [address] [place] [address] (oncoming nurse) by Shasta Rivers RN (offgoing nurse). Report included the following information SBAR, Kardex, Procedure Summary, Intake/Output, MAR, Accordion, Recent Results and Cardiac Rhythm NSR- Sinus tachy. Patient on bed with family on the side. Initial assessment done. Complaining of 6/10 head ache with pressure like pain. PRN tylenol was given. Patient requesting if she can take atarax, which is not on her medication list.     2030  EKG 2 hrs post Tikosyn done. QTC is 471.    2100   Was able to talk to Dr. Jose Angel Avila. He ordered Ambien instead. But patient said that she's having adverse reaction with ambien. 2115  Dr. Ilda Taylor aware about the [place]. Dr. Jose Angel Avila was able to talk to Dr. Velma Mir and ordered to give Atarax. 2200  Scheduled meds given. 0000  PRN Tylenol given for headache of 6/10. Visit Vitals  /71 (BP 1 Location: Left arm, BP Patient Position: At rest)   Pulse 91   Temp 97.7 °F (36.5 °C)   Resp 18   Ht 5' (1.524 m)   Wt 101.5 kg (223 lb 12.8 oz)   SpO2 97%   BMI 43.71 kg/m²     0615  Called Lab to get an update about the BMP and Mg that was sent 4:18. As per , machine went down and they are just processing the labs. 0630   Dr. Velma Mir made aware about the QTC and the potassium of 3.7. Ordered to hold 6:30 dose of Tikosyn until he gets to see the patient later today. 0700  Stat Potassium was given. Day nurse aware about due BMP 2 hours post potassium. Bedside and Verbal shift change report given to Antoine Barber (oncoming nurse) by Kym Song RN (offgoing nurse). Report included the following information SBAR, Kardex, Procedure Summary, Intake/Output, MAR, Accordion, Recent Results and Cardiac Rhythm Sinus tachy- NSR.

## 2019-06-21 NOTE — PROGRESS NOTES
Spiritual Care Assessment/Progress Note  [address]      NAME: Bryce Rodríguez      MRN: [id number]  AGE: 30 y.o. SEX: female  Jewish Affiliation: Non Tenriism   Language: English     6/21/2019     Total Time (in minutes): 18     Spiritual Assessment begun in [facility] 3 PRO CARE TELE 2 through conversation with:         [x]Patient        [] Family    [] Friend(s)        Reason for Consult: Code Blue/Debra     Spiritual beliefs: (Please include comment if needed)     [] Identifies with a cinthia tradition:         [] Supported by a cinthia community:            [x] Claims no spiritual orientation:           [] Seeking spiritual identity:                [] Adheres to an individual form of spirituality:           [] Not able to assess:                           Identified resources for coping:      [] Prayer                               [] Music                  [] Guided Imagery     [] Family/friends                 [] Pet visits     [] Devotional reading                         [] Unknown     [] Other:                                           Interventions offered during this visit: (See comments for more details)    Patient Interventions: Affirmation of emotions/emotional suffering, Iconic (affirming the presence of God/Higher Power)           Plan of Care:     [] Support spiritual and/or cultural needs    [] Support AMD and/or advance care planning process      [] Support grieving process   [] Coordinate Rites and/or Rituals    [] Coordination with community clergy   [] No spiritual needs identified at this time   [] Detailed Plan of Care below (See Comments)  [] Make referral to Music Therapy  [] Make referral to Pet Therapy     [] Make referral to Addiction services  [] Make referral to [facility]  [] Make referral to Spiritual Care Partner  [] No future visits requested        [x] Follow up visits as needed     Comments: Responded to Code Blue on [facility].   Miss Leela Villegas was doing ok after most of staff left her room. She apparently uses some humor to cope. She shared she doesn't have any spiritual beliefs and just wanted to rest at this point.   Advised of  Availability   Visited by: Marjorie Vinson, MS., [address] [facility] [address] [place] ([id number])

## 2019-06-21 NOTE — PROGRESS NOTES
SUBJECTIVE      First dose of tikosyn given last night. QTc lengthened following dosing however remains <500 msec. Without recurrent long RP tachycardia since last night. 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    Secondary adrenal insufficiency ([facility] [address] 08/02/2018    Severe obesity (BMI 35.0-39.9) 07/31/2018    Inappropriate sinus tachycardia 08/11/2016    Tachycardia 07/08/2015    Syncope and collapse 06/27/2012    Bipolar disorder, unspecified ([facility] [address] 06/27/2012    Orthostatic hypotension 06/27/2012    UTI (urinary tract infection) 06/27/2012          MEDICATIONS     Current Facility-Administered Medications   Medication Dose Route Frequency    dofetilide (TIKOSYN) capsule 250 mcg  250 mcg Oral Q12H    potassium chloride SR (KLOR-CON 10) tablet 40 mEq  40 mEq Oral NOW    pantoprazole (PROTONIX) tablet 40 mg  40 mg Oral ACB    norethindrone (MICRONOR) tablet 0.35 mg (Patient Supplied)  0.35 mg Oral QHS    montelukast (SINGULAIR) tablet 10 mg  10 mg Oral DAILY    clonazePAM (KlonoPIN) tablet 0.5 mg  0.5 mg Oral TID PRN    Tikosyn: EKG reminder note.  Please document Qtc on MAR 2 hours post Tikosyn dose   1 Each Other Q12H    celecoxib (CELEBREX) capsule 200 mg  200 mg Oral BID    cetirizine (ZYRTEC) tablet 10 mg  10 mg Oral DAILY    fluticasone propionate (FLONASE) 50 mcg/actuation nasal spray 2 Spray  2 Spray Both Nostrils DAILY    hydrocortisone (CORTEF) tablet 20 mg  20 mg Oral 7am    methocarbamol (ROBAXIN) tablet 500 mg  500 mg Oral BID PRN    lisdexamfetamine (VYVANSE) capsule 20 mg (Patient Supplied)  20 mg Oral DAILY    acetaminophen (TYLENOL) tablet 650 mg  650 mg Oral Q4H PRN    hydrocortisone (CORTEF) tablet 10 mg  10 mg Oral DAILY    dilTIAZem (CARDIZEM) IR tablet 30 mg  30 mg Oral AC&HS    zolpidem (AMBIEN) tablet 5 mg  5 mg Oral QHS PRN    hydrOXYzine HCl (ATARAX) tablet 25 mg  25 mg Oral QHS          PAST MEDICAL HISTORY     Past Medical History:   Diagnosis Date    Adrenal insufficiency (HCC)     Asthma     Charcot-Yesi-Tooth disease     Orthostatic hypotension     Other ill-defined conditions(799.89)     neurologicaly induced CMT    Psychiatric disorder     bipolar           PAST SURGICAL HISTORY     Past Surgical History:   Procedure Laterality Date    HX ORTHOPAEDIC      bilat feet    MS INSERTION SUBQ CARDIAC RHYTHM MONITOR W/PRGRMG N/A 2/8/2019    LOOP RECORDER INSERT performed by Nohelia Barrett MD at [facility]          ALLERGIES     Allergies   Allergen Reactions    Benadryl [Diphenhydramine Hcl] Other (comments)     hallucinations    Diphenhydramine Other (comments)    Hydromorphone Other (comments)    Morphine Shortness of Breath    Percocet [Oxycodone-Acetaminophen] Itching          FAMILY HISTORY     Family History   Problem Relation Age of Onset    Cancer Mother     Alcohol abuse Maternal Aunt     Diabetes Maternal Grandmother     Hypertension Maternal Grandmother     Cancer Paternal Grandmother     Diabetes Paternal Grandmother     Alcohol abuse Paternal Grandfather     negative for cardiac disease     SOCIAL HISTORY     Social History     Socioeconomic History    Marital status: SINGLE     Spouse name: Not on file    Number of children: Not on file    Years of education: Not on file    Highest education level: Not on file   Tobacco Use    Smoking status: Never Smoker    Smokeless tobacco: Never Used   Substance and Sexual Activity    Alcohol use: No     Alcohol/week: 0.0 oz    Drug use: No    Sexual activity: Yes     Partners: Male     Birth control/protection: Inserts       MEDICATIONS     Current Facility-Administered Medications   Medication Dose Route Frequency    dofetilide (TIKOSYN) capsule 250 mcg  250 mcg Oral Q12H    potassium chloride SR (KLOR-CON 10) tablet 40 mEq  40 mEq Oral NOW    pantoprazole (PROTONIX) tablet 40 mg  40 mg Oral ACB    norethindrone (MICRONOR) tablet 0.35 mg (Patient Supplied)  0.35 mg Oral QHS    montelukast (SINGULAIR) tablet 10 mg  10 mg Oral DAILY    clonazePAM (KlonoPIN) tablet 0.5 mg  0.5 mg Oral TID PRN    Tikosyn: EKG reminder note. Please document Qtc on MAR 2 hours post Tikosyn dose   1 Each Other Q12H    celecoxib (CELEBREX) capsule 200 mg  200 mg Oral BID    cetirizine (ZYRTEC) tablet 10 mg  10 mg Oral DAILY    fluticasone propionate (FLONASE) 50 mcg/actuation nasal spray 2 Spray  2 Spray Both Nostrils DAILY    hydrocortisone (CORTEF) tablet 20 mg  20 mg Oral 7am    methocarbamol (ROBAXIN) tablet 500 mg  500 mg Oral BID PRN    lisdexamfetamine (VYVANSE) capsule 20 mg (Patient Supplied)  20 mg Oral DAILY    acetaminophen (TYLENOL) tablet 650 mg  650 mg Oral Q4H PRN    hydrocortisone (CORTEF) tablet 10 mg  10 mg Oral DAILY    dilTIAZem (CARDIZEM) IR tablet 30 mg  30 mg Oral AC&HS    zolpidem (AMBIEN) tablet 5 mg  5 mg Oral QHS PRN    hydrOXYzine HCl (ATARAX) tablet 25 mg  25 mg Oral QHS       I have reviewed the nurses notes, vitals, problem list, allergy list, medical history, family, social history and medications. REVIEW OF SYMPTOMS      General: Pt denies excessive weight gain or loss. Pt is able to conduct ADL's  HEENT: Denies blurred vision, headaches, hearing loss, epistaxis and difficulty swallowing. Respiratory: Denies cough, congestion, shortness of breath, BRANDON, wheezing or stridor.   Cardiovascular: Denies precordial pain, palpitations, edema or PND  Gastrointestinal: Denies poor appetite, indigestion, abdominal pain or blood in stool  Genitourinary: Denies hematuria, dysuria, increased urinary frequency  Musculoskeletal: Denies joint pain or swelling from muscles or joints  Neurologic: Denies tremor, paresthesias, headache, or sensory motor disturbance  Psychiatric: Denies confusion, insomnia, depression  Integumentray: Denies rash, itching or ulcers. Hematologic: Denies easy bruising, bleeding       PHYSICAL EXAMINATION      Vitals:    06/20/19 2347 06/21/19 0348 06/21/19 0700 06/21/19 0753   BP: 118/71 107/71  105/74   Pulse: 90 91 80 98   Resp: 18 18  18   Temp: 98 °F (36.7 °C) 97.7 °F (36.5 °C)  97.3 °F (36.3 °C)   SpO2: 98% 97%  99%   Weight:       Height:         General: Well developed, in no acute distress. HEENT: No jaundice, oral mucosa moist, no oral ulcers  Neck: Supple, no stiffness, no lymphadenopathy, supple  Heart:  Normal S1/S2 negative S3 or S4. Regular, no murmur, gallop or rub, no jugular venous distention  Respiratory: Clear bilaterally x 4, no wheezing or rales  Abdomen:   Soft, non-tender, bowel sounds are active.   Extremities:  No edema, normal cap refill, no cyanosis. Musculoskeletal: No clubbing, no deformities  Neuro: A&Ox3, speech clear, gait stable, cooperative, no focal neurologic deficits  Skin: Skin color is normal. No rashes or lesions.  Non diaphoretic, moist.  Vascular: 2+ pulses symmetric in all extremities       DIAGNOSTIC DATA      TELEMETRY: Long RP tachycardia yesterday       LABORATORY DATA      Lab Results   Component Value Date/Time    WBC 8.9 06/20/2019 10:05 AM    Hemoglobin (POC) 13.9 09/07/2017 05:10 PM    HGB 11.9 06/20/2019 10:05 AM    Hematocrit (POC) 41 09/07/2017 05:10 PM    HCT 39.1 06/20/2019 10:05 AM    PLATELET 631 49/87/7406 10:05 AM    MCV 89.1 06/20/2019 10:05 AM      Lab Results   Component Value Date/Time    Sodium 142 06/21/2019 04:18 AM    Potassium 3.7 06/21/2019 04:18 AM    Chloride 110 (H) 06/21/2019 04:18 AM    CO2 25 06/21/2019 04:18 AM    Anion gap 7 06/21/2019 04:18 AM    Glucose 113 (H) 06/21/2019 04:18 AM    BUN 14 06/21/2019 04:18 AM    Creatinine 0.81 06/21/2019 04:18 AM    BUN/Creatinine ratio 17 06/21/2019 04:18 AM    GFR est AA >60 06/21/2019 04:18 AM    GFR est non-AA >60 06/21/2019 04:18 AM    Calcium 8.5 06/21/2019 04:18 AM    Bilirubin, total 0.3 09/07/2017 05:08 PM    AST (SGOT) 13 (L) 09/07/2017 05:08 PM    Alk. phosphatase 83 09/07/2017 05:08 PM    Protein, total 7.5 09/07/2017 05:08 PM    Albumin 3.6 09/07/2017 05:08 PM    Globulin 3.9 09/07/2017 05:08 PM    A-G Ratio 0.9 (L) 09/07/2017 05:08 PM    ALT (SGPT) 27 09/07/2017 05:08 PM           ASSESSMENT      1. Tachycardia  2. Anxiety  3. Depression  4. OCD  5. CMT       PLAN     Continue diltiazem at current dosing however will lower tikosyn to 250 mcg bid and continue to monitor QTc intervals.          Konrad Padron MD  Cardiac Electrophysiology / Cardiology    [address]  [address] [place] [address], [address]  [phone] / [phone] Fax   [phone] / [phone] Fax

## 2019-06-21 NOTE — PROGRESS NOTES
Problem: Falls - Risk of  Goal: *Absence of Falls  Description  Document Topher Marino Fall Risk and appropriate interventions in the flowsheet.   Outcome: Progressing Towards Goal

## 2019-06-21 NOTE — PROGRESS NOTES
Code blue called 1030  Patient had syncopal episode/unrepsonive while sitting in the chair  + LOC  NO loss of pulse  /101  's  's    Patient answering to verbal/tactile stimuli - following commands  Fluid bolus given  40 mg IV steroids x1   ECG shows sinus tachycardia - no ST elevation     Patient apparently has been having these episodes of syncope at home     1110: patient A & O x3 sitting up in bed talking on cell phone- feeling better    D/w Dr Ary Ibarra and Dakota Crespo RN

## 2019-06-21 NOTE — PROGRESS NOTES
Visit documented 6/21/19  Spiritual Care Partner Volunteer visited patient in [facility] on 6/20/19.   Documented by: Jae Hernandez, MS., [address] [place] ([id number])

## 2019-06-21 NOTE — CONSULTS
PSYCHIATRY CONSULT NOTE:    CC: \"I was trying to trim down on my meds. \"    REASON FOR CONSULT:  Bipolar/OCD    HISTORY OF PRESENTING COMPLAINT:  Per H&P: Omid Weinstein is a 30 y.o. female  with CMT, IST/POTS presenting after being diagnosed with secondary adrenal insufficiency recently. She had overall noticed acceptable HR's on her Fitbit however had episodes where her HR elevated and resulted in fatigue. These episodes were usually being driven by pain, etc however she noticed her HR was slow in de-escalating.  She was continued on a as needed diltiazem regimen.  However she was subsequently hospitalized in [facility] with progression of her secondary adrenal insufficiency.  While in the emergency room she experienced 1 of her clinical \"disconnects\" where he became unresponsive.  She reported ER staff stating she was exhibiting recurrent episodes of ventricular tachycardia at the time.  Strips of this were not available for review.  She was back to taking diltiazem daily. She underwent injection of a loop recorder to monitor for ventricular arrhythmias. ILR thus far reveals only sinus tachycardia with oversensing. She triggered the ILR once (though not for her full blown clinical episode) during which SR was observed.      She is here for Tikosyn load today. The patient was on several psychiatric medications for her OCD/bipolar disorder PTA. She \"weaned herself\" off of the topamax & clomipramine about 1 month ago. She recently stopped the  lexapro as well  (but took one lexapro this AM) She stopped all of these medications because she was concerned about potential interactions with the Tikosyn. She is aware atarax has a potential interaction- but only take 1/night to help her sleep. She would like to be on the clomipramine if possible.        PAST PSYCHIATRIC HISTORY:  Client reports Hx Bipolar I disorder + OCD + PTSD + ADHD. Suspects she may have ASD, however, has never been though formal testing.  Most recently managed on lexapro 20, lamotragine 100, anafranil 75, topomax 50 TID, and vyvanse 20. Recently decided to wean self off of all so that she may trial Tikosyn. Since then, she has been experiencing symptoms of hypomania--hypersexual, increased paranoia, increased agitation, feeling people are talking about her, general mood instability. Does have Hx manic episodes with hospitalization, last in July 2017. She follows up with Dr Kerwin Arias @ [facility] in [facility]. Denies any HI/SI/AVH currently. Interested in getting back on minimal meds that may help her to manage her OCD and stabilize moods that will not interact with Tikosyn. SUBSTANCE ABUSE HISTORY:  Social History     Substance and Sexual Activity   Drug Use No     Social History     Substance and Sexual Activity   Alcohol Use No    Alcohol/week: 0.0 oz     Social History     Tobacco Use   Smoking Status Never Smoker   Smokeless Tobacco Never Used       PAST MEDICAL HISTORY:  Past Medical History:   Diagnosis Date    Adrenal insufficiency ([address]     Asthma     Charcot-Yesi-Tooth disease     Orthostatic hypotension     Other ill-defined conditions(799.89)     neurologicaly induced CMT    Psychiatric disorder     bipolar       SOCIAL HISTORY:  Permanent residence is mom's house, but has been staying with her partner in [facility]. They plan to move to [facility] together soon as he recently accepted a job there. Feeling very supported in their relationship. Feeling that her life is generally going well at this time. No current substance use reported.      VITALS:  Visit Vitals  /85 (BP 1 Location: Right arm, BP Patient Position: At rest)   Pulse (!) 108   Temp 97.9 °F (36.6 °C)   Resp 16   Ht 5' (1.524 m)   Wt 101.5 kg (223 lb 12.8 oz)   SpO2 98%   BMI 43.71 kg/m²       MEDICATIONS:    Current Facility-Administered Medications:     dofetilide (TIKOSYN) capsule 250 mcg, 250 mcg, Oral, Q12H, Loraine Naranjo NP, 250 mcg at 06/21/19 0929    potassium chloride SR (KLOR-CON 10) tablet 40 mEq, 40 mEq, Oral, NOW, Reji FAUST NP, Stopped at 06/21/19 0900    Tikosyn: ECG reminder note.  Please document QTc on MAR 2 hrs after Tikosyn admin, 1 Each, Other, Q12H, Molly Snell MD, 1 Each at 06/21/19 1100    pantoprazole (PROTONIX) tablet 40 mg, 40 mg, Oral, ACB, Loraine Naranjo NP, 40 mg at 06/21/19 8302    norethindrone (MICRONOR) tablet 0.35 mg (Patient Supplied), 0.35 mg, Oral, QHS, Loraine Naranjo NP, 0.35 mg at 06/20/19 2214    montelukast (SINGULAIR) tablet 10 mg, 10 mg, Oral, DAILY, Loraine Naranjo NP, 10 mg at 06/21/19 6859    clonazePAM (KlonoPIN) tablet 0.5 mg, 0.5 mg, Oral, TID PRN, Aurea Walls NP    celecoxib (CELEBREX) capsule 200 mg, 200 mg, Oral, BID, Loraine Naranjo NP, 200 mg at 06/21/19 4273    cetirizine (ZYRTEC) tablet 10 mg, 10 mg, Oral, DAILY, Loraine Naranjo NP, 10 mg at 06/21/19 0929    fluticasone propionate (FLONASE) 50 mcg/actuation nasal spray 2 Spray, 2 Spray, Both Nostrils, DAILY, Loraine Naranjo NP, 2 Spray at 06/21/19 1115    hydrocortisone (CORTEF) tablet 20 mg, 20 mg, Oral, 7am, Loraine Naranjo NP, 20 mg at 06/21/19 3631    methocarbamol (ROBAXIN) tablet 500 mg, 500 mg, Oral, BID PRN, Reji FAUST NP, 500 mg at 06/21/19 1440    lisdexamfetamine (VYVANSE) capsule 20 mg (Patient Supplied), 20 mg, Oral, DAILY, Loraine Mustafa NP    acetaminophen (TYLENOL) tablet 650 mg, 650 mg, Oral, Q4H PRN, Loraine Mustafa NP, 650 mg at 06/21/19 0004    hydrocortisone (CORTEF) tablet 10 mg, 10 mg, Oral, DAILY, NaranjoLoraine NP, 10 mg at 06/21/19 1440    dilTIAZem (CARDIZEM) IR tablet 30 mg, 30 mg, Oral, AC&HS, Loraine Mustafa NP, 30 mg at 06/21/19 1115    zolpidem (AMBIEN) tablet 5 mg, 5 mg, Oral, QHS PRN, Usama Alicea MD    hydrOXYzine HCl (ATARAX) tablet 25 mg, 25 mg, Oral, QHS, Usama Alicea MD, 25 mg at 06/20/19 2214    MENTAL STATUS EXAM:    Alert and oriented to all spheres  Logical  Mood: euthymic  Affect: appropriate  Thoughts: logical  Speech: spontaneous, reg rate/rhythm  Sensorium: No A/V hallucinations  Safety: no SI/HI     ASSESSMENT AND PLAN:  Axis I: Bipolar, mixed/OCD/PTSD, probably ASD  Axis II: Cluster B Traits   Axis III: See chart  Axis IV: trauma Hx  Axis V: GAF today, 51-60 moderate symptoms    Assessment: Garrett Jimenez is met in room. She is sitting with visitor, talking calmly, NAD noted. Asked visitor to leave when writer arrived. She is appropriately alert and aware. Well-educated in her diagnosis and medications. Reports moods today as okay, but has been approaching hypomania recently. Affect is congruent. Today speech is not pressured, not loud, not rapid. Calm and cooperative. Hygiene appropriate. Reviewed list of meds she has tried in the past and discussed options available to her. Answered questions about meds and came to an agreeable treatment plan. No HI/SI/AVH today. Is not a candidate for psychiatric admission and should be able to continue to follow up with Alexy after discharge. Plan/Recommendations:  1.) Will start Luvox 50 mg HS for OCD/social anxieties  2.) Will start low-dose Risperdal BID for mood stabilization and irritability. This will likely need to be increased, but will start low due to sensitivities reported to [place] and Abilify  3.) Recommend client follow up with psychiatrist for GeneSight testing due to multiple unexpected med reactions in the past.     Thank you to the medical team for caring for this client and including psychiatry in her care.      Willam Stoddard NP  6/21/2019

## 2019-06-22 LAB
ANION GAP SERPL CALC-SCNC: 6 MMOL/L (ref 5–15)
BUN SERPL-MCNC: 12 MG/DL (ref 6–20)
BUN/CREAT SERPL: 14 (ref 12–20)
CALCIUM SERPL-MCNC: 9 MG/DL (ref 8.5–10.1)
CHLORIDE SERPL-SCNC: 109 MMOL/L (ref 97–108)
CO2 SERPL-SCNC: 24 MMOL/L (ref 21–32)
CREAT SERPL-MCNC: 0.84 MG/DL (ref 0.55–1.02)
GLUCOSE SERPL-MCNC: 191 MG/DL (ref 65–100)
MAGNESIUM SERPL-MCNC: 2.4 MG/DL (ref 1.6–2.4)
POTASSIUM SERPL-SCNC: 4.4 MMOL/L (ref 3.5–5.1)
SODIUM SERPL-SCNC: 139 MMOL/L (ref 136–145)

## 2019-06-22 PROCEDURE — 83735 ASSAY OF MAGNESIUM: CPT

## 2019-06-22 PROCEDURE — 74011250637 HC RX REV CODE- 250/637: Performed by: SPECIALIST

## 2019-06-22 PROCEDURE — 65660000000 HC RM CCU STEPDOWN

## 2019-06-22 PROCEDURE — 36415 COLL VENOUS BLD VENIPUNCTURE: CPT

## 2019-06-22 PROCEDURE — 74011250637 HC RX REV CODE- 250/637: Performed by: NURSE PRACTITIONER

## 2019-06-22 PROCEDURE — 93005 ELECTROCARDIOGRAM TRACING: CPT

## 2019-06-22 PROCEDURE — 80048 BASIC METABOLIC PNL TOTAL CA: CPT

## 2019-06-22 PROCEDURE — 74011250637 HC RX REV CODE- 250/637: Performed by: INTERNAL MEDICINE

## 2019-06-22 RX ORDER — DILTIAZEM HYDROCHLORIDE 30 MG/1
60 TABLET, FILM COATED ORAL ONCE
Status: COMPLETED | OUTPATIENT
Start: 2019-06-22 | End: 2019-06-22

## 2019-06-22 RX ADMIN — RISPERIDONE 0.5 MG: 1 TABLET ORAL at 21:46

## 2019-06-22 RX ADMIN — CETIRIZINE HYDROCHLORIDE 10 MG: 10 TABLET, FILM COATED ORAL at 09:42

## 2019-06-22 RX ADMIN — PANTOPRAZOLE SODIUM 40 MG: 40 TABLET, DELAYED RELEASE ORAL at 06:47

## 2019-06-22 RX ADMIN — DILTIAZEM HYDROCHLORIDE 30 MG: 30 TABLET, FILM COATED ORAL at 06:47

## 2019-06-22 RX ADMIN — DILTIAZEM HYDROCHLORIDE 30 MG: 30 TABLET, FILM COATED ORAL at 15:38

## 2019-06-22 RX ADMIN — RISPERIDONE 0.5 MG: 1 TABLET ORAL at 09:43

## 2019-06-22 RX ADMIN — HYDROCORTISONE 20 MG: 10 TABLET ORAL at 06:47

## 2019-06-22 RX ADMIN — FLUVOXAMINE MALEATE 50 MG: 50 TABLET, COATED ORAL at 13:54

## 2019-06-22 RX ADMIN — MONTELUKAST SODIUM 10 MG: 10 TABLET, FILM COATED ORAL at 09:42

## 2019-06-22 RX ADMIN — DILTIAZEM HYDROCHLORIDE 30 MG: 30 TABLET, FILM COATED ORAL at 21:45

## 2019-06-22 RX ADMIN — DILTIAZEM HYDROCHLORIDE 60 MG: 30 TABLET, FILM COATED ORAL at 21:46

## 2019-06-22 RX ADMIN — ACETAMINOPHEN AND CODEINE PHOSPHATE 0.35 MG: 120; 12 SOLUTION ORAL at 21:54

## 2019-06-22 RX ADMIN — DOFETILIDE 250 MCG: 0.25 CAPSULE ORAL at 21:46

## 2019-06-22 RX ADMIN — CELECOXIB 200 MG: 100 CAPSULE ORAL at 09:42

## 2019-06-22 RX ADMIN — HYDROCORTISONE 10 MG: 10 TABLET ORAL at 15:38

## 2019-06-22 RX ADMIN — ACETAMINOPHEN 650 MG: 325 TABLET ORAL at 21:45

## 2019-06-22 RX ADMIN — CELECOXIB 200 MG: 100 CAPSULE ORAL at 17:24

## 2019-06-22 RX ADMIN — DILTIAZEM HYDROCHLORIDE 30 MG: 30 TABLET, FILM COATED ORAL at 12:36

## 2019-06-22 RX ADMIN — HYDROXYZINE HYDROCHLORIDE 25 MG: 25 TABLET, FILM COATED ORAL at 21:54

## 2019-06-22 RX ADMIN — FLUTICASONE PROPIONATE 2 SPRAY: 50 SPRAY, METERED NASAL at 09:43

## 2019-06-22 RX ADMIN — DOFETILIDE 250 MCG: 0.25 CAPSULE ORAL at 09:42

## 2019-06-22 NOTE — PROGRESS NOTES
Problem: Falls - Risk of  Goal: *Absence of Falls  Description  Document Radha Paz Fall Risk and appropriate interventions in the flowsheet.   Outcome: Progressing Towards Goal

## 2019-06-22 NOTE — PROGRESS NOTES
0750: Bedside and Verbal shift change report given to Tiara Mata RN Extern and Jose L Martínez RN (oncoming nurses) by Sang Sabillon RN (offgoing nurse). Report included the following information, SBAR, Kardex, Procedure Summary, Intake/Output, MAR, Accordion, and Cardiac Rhythm Sinus Tach.     0759: Pt brought to the bathroom, bathed, vital signs taken and assessed head to toe. Calm, cooperative and seemingly happy. Not manic or in a depressed state. 0940: Dr. Alberto Castillo called by Jose L Martínez RN and confirmed that it was okay to give dose of Tikosyn. QTC will be assessed at approximately 1145. Other medications also were given at this time. Generalized pain at a 3/10 and no pain medication requested. Bedside and Verbal shift change report given to Ramirez Dove RN (oncoming nurse) by Klaudia Ba RN (offgoing nurse). Report included the following information SBAR, Kardex, Intake/Output, MAR, Recent Results and Cardiac Rhythm Normal Sinus Rhythm .

## 2019-06-22 NOTE — PROGRESS NOTES
SUBJECTIVE      Continues to exhibit tachycardia; QTc stable. No recurrent episodes of near syncope since yesterday.        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    Secondary adrenal insufficiency ([facility] 08/02/2018    Severe obesity (BMI 35.0-39.9) 07/31/2018    Inappropriate sinus tachycardia 08/11/2016    Tachycardia 07/08/2015    Syncope and collapse 06/27/2012    Bipolar disorder, unspecified ([facility] 06/27/2012    Orthostatic hypotension 06/27/2012    UTI (urinary tract infection) 06/27/2012          MEDICATIONS     Current Facility-Administered Medications   Medication Dose Route Frequency    Dofelitide ECG reminder note: Please document QTc on MAR 2hrs after giving dose  1 Each Other ONCE    dofetilide (TIKOSYN) capsule 250 mcg  250 mcg Oral Q12H    fluvoxaMINE (LUVOX) tablet 50 mg  50 mg Oral QHS    risperiDONE (RisperDAL) tablet 0.5 mg  0.5 mg Oral BID    pantoprazole (PROTONIX) tablet 40 mg  40 mg Oral ACB    norethindrone (MICRONOR) tablet 0.35 mg (Patient Supplied)  0.35 mg Oral QHS    montelukast (SINGULAIR) tablet 10 mg  10 mg Oral DAILY    clonazePAM (KlonoPIN) tablet 0.5 mg  0.5 mg Oral TID PRN    celecoxib (CELEBREX) capsule 200 mg  200 mg Oral BID    cetirizine (ZYRTEC) tablet 10 mg  10 mg Oral DAILY    fluticasone propionate (FLONASE) 50 mcg/actuation nasal spray 2 Spray  2 Spray Both Nostrils DAILY    hydrocortisone (CORTEF) tablet 20 mg  20 mg Oral 7am    methocarbamol (ROBAXIN) tablet 500 mg  500 mg Oral BID PRN    lisdexamfetamine (VYVANSE) capsule 20 mg (Patient Supplied)  20 mg Oral DAILY    acetaminophen (TYLENOL) tablet 650 mg  650 mg Oral Q4H PRN    hydrocortisone (CORTEF) tablet 10 mg  10 mg Oral DAILY    dilTIAZem (CARDIZEM) IR tablet 30 mg  30 mg Oral AC&HS    zolpidem (AMBIEN) tablet 5 mg  5 mg Oral QHS PRN    hydrOXYzine HCl (ATARAX) tablet 25 mg  25 mg Oral QHS          PAST MEDICAL HISTORY     Past Medical History:   Diagnosis Date    Adrenal insufficiency (HCC)     Asthma     Charcot-Yesi-Tooth disease     Orthostatic hypotension     Other ill-defined conditions(799.89)     neurologicaly induced CMT    Psychiatric disorder     bipolar           PAST SURGICAL HISTORY     Past Surgical History:   Procedure Laterality Date    HX ORTHOPAEDIC      bilat feet    NE INSERTION SUBQ CARDIAC RHYTHM MONITOR W/PRGRMG N/A 2/8/2019    LOOP RECORDER INSERT performed by Kaci Lloyd MD at [facility]          ALLERGIES     Allergies   Allergen Reactions    Benadryl [Diphenhydramine Hcl] Other (comments)     hallucinations    Diphenhydramine Other (comments)    Hydromorphone Other (comments)    Morphine Shortness of Breath    Percocet [Oxycodone-Acetaminophen] Itching          FAMILY HISTORY     Family History   Problem Relation Age of Onset    Cancer Mother     Alcohol abuse Maternal Aunt     Diabetes Maternal Grandmother     Hypertension Maternal Grandmother     Cancer Paternal Grandmother     Diabetes Paternal Grandmother     Alcohol abuse Paternal Grandfather     negative for cardiac disease     SOCIAL HISTORY     Social History     Socioeconomic History    Marital status: SINGLE     Spouse name: Not on file    Number of children: Not on file    Years of education: Not on file    Highest education level: Not on file   Tobacco Use    Smoking status: Never Smoker    Smokeless tobacco: Never Used   Substance and Sexual Activity    Alcohol use: No     Alcohol/week: 0.0 oz    Drug use: No    Sexual activity: Yes     Partners: Male     Birth control/protection: Inserts       MEDICATIONS     Current Facility-Administered Medications   Medication Dose Route Frequency    Dofelitide ECG reminder note: Please document QTc on MAR 2hrs after giving dose  1 Each Other ONCE    dofetilide (Galilea Kimble) capsule 250 mcg  250 mcg Oral Q12H    fluvoxaMINE (LUVOX) tablet 50 mg  50 mg Oral QHS    risperiDONE (RisperDAL) tablet 0.5 mg  0.5 mg Oral BID    pantoprazole (PROTONIX) tablet 40 mg  40 mg Oral ACB    norethindrone (MICRONOR) tablet 0.35 mg (Patient Supplied)  0.35 mg Oral QHS    montelukast (SINGULAIR) tablet 10 mg  10 mg Oral DAILY    clonazePAM (KlonoPIN) tablet 0.5 mg  0.5 mg Oral TID PRN    celecoxib (CELEBREX) capsule 200 mg  200 mg Oral BID    cetirizine (ZYRTEC) tablet 10 mg  10 mg Oral DAILY    fluticasone propionate (FLONASE) 50 mcg/actuation nasal spray 2 Spray  2 Spray Both Nostrils DAILY    hydrocortisone (CORTEF) tablet 20 mg  20 mg Oral 7am    methocarbamol (ROBAXIN) tablet 500 mg  500 mg Oral BID PRN    lisdexamfetamine (VYVANSE) capsule 20 mg (Patient Supplied)  20 mg Oral DAILY    acetaminophen (TYLENOL) tablet 650 mg  650 mg Oral Q4H PRN    hydrocortisone (CORTEF) tablet 10 mg  10 mg Oral DAILY    dilTIAZem (CARDIZEM) IR tablet 30 mg  30 mg Oral AC&HS    zolpidem (AMBIEN) tablet 5 mg  5 mg Oral QHS PRN    hydrOXYzine HCl (ATARAX) tablet 25 mg  25 mg Oral QHS       I have reviewed the nurses notes, vitals, problem list, allergy list, medical history, family, social history and medications. REVIEW OF SYMPTOMS      General: Pt denies excessive weight gain or loss. Pt is able to conduct ADL's  HEENT: Denies blurred vision, headaches, hearing loss, epistaxis and difficulty swallowing. Respiratory: Denies cough, congestion, shortness of breath, BRANDON, wheezing or stridor.   Cardiovascular: Denies precordial pain, palpitations, edema or PND  Gastrointestinal: Denies poor appetite, indigestion, abdominal pain or blood in stool  Genitourinary: Denies hematuria, dysuria, increased urinary frequency  Musculoskeletal: Denies joint pain or swelling from muscles or joints  Neurologic: Denies tremor, paresthesias, headache, or sensory motor disturbance  Psychiatric: Denies confusion, insomnia, depression  Integumentray: Denies rash, itching or ulcers. Hematologic: Denies easy bruising, bleeding       PHYSICAL EXAMINATION      Vitals:    06/22/19 0350 06/22/19 0650 06/22/19 0759 06/22/19 1145   BP: 103/63  116/77 120/74   Pulse: (!) 104 96 (!) 104 100   Resp: 18  18 16   Temp: 98 °F (36.7 °C)  97.8 °F (36.6 °C) 98.1 °F (36.7 °C)   SpO2: 95%  97% 96%   Weight: 223 lb 14.4 oz (101.6 kg)      Height:         General: Well developed, in no acute distress. HEENT: No jaundice, oral mucosa moist, no oral ulcers  Neck: Supple, no stiffness, no lymphadenopathy, supple  Heart:  Normal S1/S2 negative S3 or S4. Regular, no murmur, gallop or rub, no jugular venous distention  Respiratory: Clear bilaterally x 4, no wheezing or rales  Abdomen:   Soft, non-tender, bowel sounds are active.   Extremities:  No edema, normal cap refill, no cyanosis. Musculoskeletal: No clubbing, no deformities  Neuro: A&Ox3, speech clear, gait stable, cooperative, no focal neurologic deficits  Skin: Skin color is normal. No rashes or lesions.  Non diaphoretic, moist.  Vascular: 2+ pulses symmetric in all extremities       DIAGNOSTIC DATA      TELEMETRY: recurrent long RP tachycardia 130s       LABORATORY DATA      Lab Results   Component Value Date/Time    WBC 8.9 06/20/2019 10:05 AM    Hemoglobin (POC) 13.9 09/07/2017 05:10 PM    HGB 11.9 06/20/2019 10:05 AM    Hematocrit (POC) 41 09/07/2017 05:10 PM    HCT 39.1 06/20/2019 10:05 AM    PLATELET 427 14/42/4964 10:05 AM    MCV 89.1 06/20/2019 10:05 AM      Lab Results   Component Value Date/Time    Sodium 139 06/22/2019 12:37 AM    Potassium 4.4 06/22/2019 12:37 AM    Chloride 109 (H) 06/22/2019 12:37 AM    CO2 24 06/22/2019 12:37 AM    Anion gap 6 06/22/2019 12:37 AM    Glucose 191 (H) 06/22/2019 12:37 AM    BUN 12 06/22/2019 12:37 AM    Creatinine 0.84 06/22/2019 12:37 AM    BUN/Creatinine ratio 14 06/22/2019 12:37 AM    GFR est AA >60 06/22/2019 12:37 AM    GFR est non-AA >60 06/22/2019 12:37 AM    Calcium 9.0 06/22/2019 12:37 AM    Bilirubin, total 0.3 09/07/2017 05:08 PM    AST (SGOT) 13 (L) 09/07/2017 05:08 PM    Alk. phosphatase 83 09/07/2017 05:08 PM    Protein, total 7.5 09/07/2017 05:08 PM    Albumin 3.6 09/07/2017 05:08 PM    Globulin 3.9 09/07/2017 05:08 PM    A-G Ratio 0.9 (L) 09/07/2017 05:08 PM    ALT (SGPT) 27 09/07/2017 05:08 PM           ASSESSMENT      1. Tachycardia  2. Anxiety  3. Depression  4. OCD  5. CMT         PLAN     Continue tikosyn load (dose 5/5 tonight); if ineffective by tomorrow will DC tikosyn and plan for OP ablation in future.          Griffin Pickard MD  Cardiac Electrophysiology / Cardiology    [address] [facility], [facility], [address]  [phone] / [phone] Fax   [phone] / [phone] Fax

## 2019-06-22 NOTE — PROGRESS NOTES
1900  Bedside and Verbal shift change report given to Fern Pan (oncoming nurse) by Jaxon Devires (offgoing nurse). Report included the following information SBAR, Kardex, Procedure Summary, Intake/Output, MAR, Accordion and Cardiac Rhythm Sinus Tachy. Patient on bed, talking on phone. Initial assessment done. Complaining of headache of 6/10. PRN tylenol given. Patient aware to call before getting out of bed.     2100  Scheduled meds were given. 26  Assisted the patient to the bathroom via personal wheelchair. Voided 500.     2300  EKG 2 hours post tikosyn done. QTC of 469. Will report to Dr. Froylan Mahmood in the morning before the next dose at 9am.     Visit Vitals  /63 (BP 1 Location: Right arm, BP Patient Position: At rest)   Pulse (!) 104   Temp 98 °F (36.7 °C)   Resp 18   Ht 5' (1.524 m)   Wt 101.6 kg (223 lb 14.4 oz)   SpO2 95%   BMI 43.73 kg/m²   '  0700  Bedside and Verbal shift change report given to Vishal RN (oncoming nurse) by Meg Alcantar RN (offgoing nurse). Report included the following information SBAR, Kardex, Procedure Summary, Intake/Output, MAR, Accordion, Recent Results and Cardiac Rhythm Sinus Tachy.

## 2019-06-22 NOTE — PROGRESS NOTES
Bedside shift change report given to [employer] (oncoming nurse) by Nicko Arias with Gwyneth Frankel (offgoing nurse). Report included the following information SBAR, Kardex and Recent Results.

## 2019-06-23 VITALS
TEMPERATURE: 98.2 F | OXYGEN SATURATION: 98 % | BODY MASS INDEX: 44.27 KG/M2 | HEIGHT: 60 IN | RESPIRATION RATE: 12 BRPM | SYSTOLIC BLOOD PRESSURE: 127 MMHG | HEART RATE: 131 BPM | WEIGHT: 225.5 LBS | DIASTOLIC BLOOD PRESSURE: 82 MMHG

## 2019-06-23 LAB
ATRIAL RATE: 104 BPM
ATRIAL RATE: 107 BPM
ATRIAL RATE: 109 BPM
ATRIAL RATE: 119 BPM
CALCULATED P AXIS, ECG09: 25 DEGREES
CALCULATED P AXIS, ECG09: 25 DEGREES
CALCULATED P AXIS, ECG09: 38 DEGREES
CALCULATED P AXIS, ECG09: 39 DEGREES
CALCULATED R AXIS, ECG10: 13 DEGREES
CALCULATED R AXIS, ECG10: 15 DEGREES
CALCULATED R AXIS, ECG10: 19 DEGREES
CALCULATED R AXIS, ECG10: 22 DEGREES
CALCULATED T AXIS, ECG11: -10 DEGREES
CALCULATED T AXIS, ECG11: -6 DEGREES
CALCULATED T AXIS, ECG11: -7 DEGREES
CALCULATED T AXIS, ECG11: 4 DEGREES
DIAGNOSIS, 93000: NORMAL
MAGNESIUM SERPL-MCNC: 2 MG/DL (ref 1.6–2.4)
P-R INTERVAL, ECG05: 124 MS
P-R INTERVAL, ECG05: 128 MS
P-R INTERVAL, ECG05: 130 MS
P-R INTERVAL, ECG05: 136 MS
Q-T INTERVAL, ECG07: 318 MS
Q-T INTERVAL, ECG07: 324 MS
Q-T INTERVAL, ECG07: 340 MS
Q-T INTERVAL, ECG07: 352 MS
QRS DURATION, ECG06: 82 MS
QRS DURATION, ECG06: 86 MS
QTC CALCULATION (BEZET), ECG08: 424 MS
QTC CALCULATION (BEZET), ECG08: 436 MS
QTC CALCULATION (BEZET), ECG08: 447 MS
QTC CALCULATION (BEZET), ECG08: 495 MS
VENTRICULAR RATE, ECG03: 104 BPM
VENTRICULAR RATE, ECG03: 107 BPM
VENTRICULAR RATE, ECG03: 109 BPM
VENTRICULAR RATE, ECG03: 119 BPM

## 2019-06-23 PROCEDURE — 93005 ELECTROCARDIOGRAM TRACING: CPT

## 2019-06-23 PROCEDURE — 74011250637 HC RX REV CODE- 250/637: Performed by: NURSE PRACTITIONER

## 2019-06-23 PROCEDURE — 36415 COLL VENOUS BLD VENIPUNCTURE: CPT

## 2019-06-23 PROCEDURE — 83735 ASSAY OF MAGNESIUM: CPT

## 2019-06-23 RX ORDER — RISPERIDONE 0.5 MG/1
0.5 TABLET, FILM COATED ORAL 2 TIMES DAILY
Qty: 60 TAB | Refills: 0 | Status: SHIPPED | OUTPATIENT
Start: 2019-06-23

## 2019-06-23 RX ORDER — FLUVOXAMINE MALEATE 50 MG/1
50 TABLET ORAL
Qty: 30 TAB | Refills: 0 | Status: SHIPPED | OUTPATIENT
Start: 2019-06-23 | End: 2020-09-01

## 2019-06-23 RX ADMIN — MONTELUKAST SODIUM 10 MG: 10 TABLET, FILM COATED ORAL at 09:47

## 2019-06-23 RX ADMIN — PANTOPRAZOLE SODIUM 40 MG: 40 TABLET, DELAYED RELEASE ORAL at 06:50

## 2019-06-23 RX ADMIN — CELECOXIB 200 MG: 100 CAPSULE ORAL at 09:47

## 2019-06-23 RX ADMIN — DOFETILIDE 250 MCG: 0.25 CAPSULE ORAL at 09:47

## 2019-06-23 RX ADMIN — RISPERIDONE 0.5 MG: 1 TABLET ORAL at 09:47

## 2019-06-23 RX ADMIN — HYDROCORTISONE 20 MG: 10 TABLET ORAL at 06:50

## 2019-06-23 RX ADMIN — DILTIAZEM HYDROCHLORIDE 30 MG: 30 TABLET, FILM COATED ORAL at 12:49

## 2019-06-23 RX ADMIN — DILTIAZEM HYDROCHLORIDE 30 MG: 30 TABLET, FILM COATED ORAL at 06:50

## 2019-06-23 RX ADMIN — CETIRIZINE HYDROCHLORIDE 10 MG: 10 TABLET, FILM COATED ORAL at 09:47

## 2019-06-23 RX ADMIN — FLUTICASONE PROPIONATE 2 SPRAY: 50 SPRAY, METERED NASAL at 09:47

## 2019-06-23 NOTE — PROGRESS NOTES
Bedside and Verbal shift change report given to Dedrick Snow RN (oncoming nurse) by Hal Aschoff RN  (offgoing nurse). Report included the following information SBAR, Kardex and Recent Results. .. 12 EKG done. .      I have reviewed discharge instructions with the patient and caregiver. The patient and spouse verbalized understanding. .. Discharged the pt via ADILSON Reynolds. Albaro Loya

## 2019-06-23 NOTE — DISCHARGE SUMMARY
Cardiac Electrophysiology Discharge Summary       Patient ID:  Dorothy Sultana  [id number]  46 y.o.  1990    Admit Date: 6/20/2019    Discharge Date: 6/23/2019     Admitting Physician: Jordan Delatorre MD     Discharge Physician: Jordan Delatorre MD    Admission Diagnoses: Visit for monitoring Tikosyn therapy [Z51.81, Z79.899]    Discharge Diagnoses: Active Problems:    Visit for monitoring Tikosyn therapy (6/20/2019)        Discharge Condition: stable    Cardiology Procedures this Admission:  tikosyn load attempted however dose had to be lowered to 250 mcg bid due to QTc lengthening. Diltiazem was started and uptitrated to 60 mg bid. However patient continued to exhibit tachycardia. Thus tikoysn felt to be ineffective. Will plan for EPS and possible ablation. Hospital Course: Patient underwent above procedure without complication and remained stable without acute events. Discharge Exam:  Visit Vitals  /82 (BP 1 Location: Right arm, BP Patient Position: At rest)   Pulse (!) 131   Temp 98.2 °F (36.8 °C)   Resp 16   Ht 5' (1.524 m)   Wt 225 lb 8 oz (102.3 kg)   SpO2 96%   BMI 44.04 kg/m²       Abdomen: soft, non-tender  Extremities: extremities normal  Heart: regular rate and rhythm  Lungs: clear to auscultation bilaterally  Neck: no JVD  Neurologic: Grossly normal  Pulses: 2+ and symmetric    Disposition: Home    Patient Instructions:   Current Discharge Medication List      START taking these medications    Details   fluvoxaMINE (LUVOX) 50 mg tablet Take 1 Tab by mouth nightly. Indications: Obsessive Compulsive Disorder  Qty: 30 Tab, Refills: 0      risperiDONE (RISPERDAL) 0.5 mg tablet Take 1 Tab by mouth two (2) times a day. Indications: Bipolar I Disorder with Most Recent Episode Mixed  Qty: 60 Tab, Refills: 0         CONTINUE these medications which have NOT CHANGED    Details   celecoxib (CELEBREX) 200 mg capsule Take 200 mg by mouth two (2) times a day.  Indications: Joint Damage causing Pain and Loss of Function      methocarbamol (ROBAXIN) 500 mg tablet Take 500 mg by mouth two (2) times a day. !! hydrocortisone (CORTEF) 10 mg tablet Take 20 mg by mouth daily (with breakfast). !! hydrocortisone (CORTEF) 10 mg tablet Take 10 mg by mouth daily (after lunch). Patient takes at [id number]      omeprazole (PRILOSEC) 40 mg capsule Take 40 mg by mouth Daily (before breakfast). magnesium 250 mg tab Take 250 mg by mouth daily as needed (muscle spasms). Associated Diagnoses: Tachycardia      hydrOXYzine HCl (ATARAX) 25 mg tablet Take 25 mg by mouth nightly. olopatadine (PATADAY) 0.2 % drop ophthalmic solution Administer 1 Drop to both eyes daily as needed (itching eyes). fluticasone (FLONASE) 50 mcg/actuation nasal spray 2 Sprays by Both Nostrils route daily. VYVANSE 20 mg capsule 20 mg once daily  Refills: 0    Associated Diagnoses: Inappropriate sinus tachycardia; Syncope and collapse; Orthostatic hypotension      montelukast (SINGULAIR) 10 mg tablet Take 10 mg by mouth daily. Associated Diagnoses: Inappropriate sinus tachycardia; Syncope and collapse; Orthostatic hypotension      Cetirizine (ZYRTEC) 10 mg cap Take 10 mg by mouth daily. Associated Diagnoses: Inappropriate sinus tachycardia; Syncope and collapse; Orthostatic hypotension      PROAIR HFA 90 mcg/actuation inhaler INHALE 2 PUFFS PO Q 4 TO 6 H PRN AND 10-15 MINUTES BEFORE EXERCISE  Refills: 3    Associated Diagnoses: Inappropriate sinus tachycardia; Syncope and collapse; Orthostatic hypotension      escitalopram oxalate (LEXAPRO) 20 mg tablet Take 20 mg by mouth daily. Associated Diagnoses: Tachycardia; Inappropriate sinus tachycardia; Syncope and collapse      clonazePAM (KLONOPIN) 0.5 mg tablet Take 0.5 mg by mouth three (3) times daily as needed. Associated Diagnoses: Syncope and collapse; Tachycardia      NORETHINDRONE (JOLIVETTE PO) Take 0.35 mg by mouth nightly.        !! - Potential duplicate medications found. Please discuss with provider. STOP taking these medications       BREO ELLIPTA 200-25 mcg/dose inhaler Comments:   Reason for Stopping:         clomiPRAMINE (ANAFRANIL) 50 mg capsule Comments:   Reason for Stopping:               Referenced discharge instructions provided by nursing for diet and activity.     Follow-up with Danielle Tijerina MD             Signed:  Christopher Vee MD  Cardiac Electrophysiology  6/23/2019  3:11 PM

## 2019-06-23 NOTE — PROGRESS NOTES
SUBJECTIVE      Telemetry shows tachycardia continues despite completing tikosyn load.        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    Secondary adrenal insufficiency ([facility] 08/02/2018    Severe obesity (BMI 35.0-39.9) 07/31/2018    Inappropriate sinus tachycardia 08/11/2016    Tachycardia 07/08/2015    Syncope and collapse 06/27/2012    Bipolar disorder, unspecified ([facility] 06/27/2012    Orthostatic hypotension 06/27/2012    UTI (urinary tract infection) 06/27/2012          MEDICATIONS     Current Facility-Administered Medications   Medication Dose Route Frequency    dofetilide (TIKOSYN) capsule 250 mcg  250 mcg Oral Q12H    fluvoxaMINE (LUVOX) tablet 50 mg  50 mg Oral QHS    risperiDONE (RisperDAL) tablet 0.5 mg  0.5 mg Oral BID    pantoprazole (PROTONIX) tablet 40 mg  40 mg Oral ACB    norethindrone (MICRONOR) tablet 0.35 mg (Patient Supplied)  0.35 mg Oral QHS    montelukast (SINGULAIR) tablet 10 mg  10 mg Oral DAILY    clonazePAM (KlonoPIN) tablet 0.5 mg  0.5 mg Oral TID PRN    celecoxib (CELEBREX) capsule 200 mg  200 mg Oral BID    cetirizine (ZYRTEC) tablet 10 mg  10 mg Oral DAILY    fluticasone propionate (FLONASE) 50 mcg/actuation nasal spray 2 Spray  2 Spray Both Nostrils DAILY    hydrocortisone (CORTEF) tablet 20 mg  20 mg Oral 7am    methocarbamol (ROBAXIN) tablet 500 mg  500 mg Oral BID PRN    lisdexamfetamine (VYVANSE) capsule 20 mg (Patient Supplied)  20 mg Oral DAILY    acetaminophen (TYLENOL) tablet 650 mg  650 mg Oral Q4H PRN    hydrocortisone (CORTEF) tablet 10 mg  10 mg Oral DAILY    dilTIAZem (CARDIZEM) IR tablet 30 mg  30 mg Oral AC&HS    zolpidem (AMBIEN) tablet 5 mg  5 mg Oral QHS PRN    hydrOXYzine HCl (ATARAX) tablet 25 mg  25 mg Oral QHS          PAST MEDICAL HISTORY     Past Medical History:   Diagnosis Date    Adrenal insufficiency (HCC)     Asthma     Charcot-Yesi-Tooth disease     Orthostatic hypotension     Other ill-defined conditions(799.89)     neurologicaly induced CMT    Psychiatric disorder     bipolar           PAST SURGICAL HISTORY     Past Surgical History:   Procedure Laterality Date    HX ORTHOPAEDIC      bilat feet    MI INSERTION SUBQ CARDIAC RHYTHM MONITOR W/PRGRMG N/A 2/8/2019    LOOP RECORDER INSERT performed by Tommie Arizmendi MD at [facility]          ALLERGIES     Allergies   Allergen Reactions    Benadryl [Diphenhydramine Hcl] Other (comments)     hallucinations    Diphenhydramine Other (comments)    Hydromorphone Other (comments)    Morphine Shortness of Breath    Percocet [Oxycodone-Acetaminophen] Itching          FAMILY HISTORY     Family History   Problem Relation Age of Onset    Cancer Mother     Alcohol abuse Maternal Aunt     Diabetes Maternal Grandmother     Hypertension Maternal Grandmother     Cancer Paternal Grandmother     Diabetes Paternal Grandmother     Alcohol abuse Paternal Grandfather     negative for cardiac disease     SOCIAL HISTORY     Social History     Socioeconomic History    Marital status: SINGLE     Spouse name: Not on file    Number of children: Not on file    Years of education: Not on file    Highest education level: Not on file   Tobacco Use    Smoking status: Never Smoker    Smokeless tobacco: Never Used   Substance and Sexual Activity    Alcohol use: No     Alcohol/week: 0.0 oz    Drug use: No    Sexual activity: Yes     Partners: Male     Birth control/protection: Inserts       MEDICATIONS     Current Facility-Administered Medications   Medication Dose Route Frequency    dofetilide (TIKOSYN) capsule 250 mcg  250 mcg Oral Q12H    fluvoxaMINE (LUVOX) tablet 50 mg  50 mg Oral QHS    risperiDONE (RisperDAL) tablet 0.5 mg  0.5 mg Oral BID    pantoprazole (PROTONIX) tablet 40 mg  40 mg Oral ACB    norethindrone (MICRONOR) tablet 0.35 mg (Patient Supplied)  0.35 mg Oral QHS    montelukast (SINGULAIR) tablet 10 mg  10 mg Oral DAILY    clonazePAM (KlonoPIN) tablet 0.5 mg  0.5 mg Oral TID PRN    celecoxib (CELEBREX) capsule 200 mg  200 mg Oral BID    cetirizine (ZYRTEC) tablet 10 mg  10 mg Oral DAILY    fluticasone propionate (FLONASE) 50 mcg/actuation nasal spray 2 Spray  2 Spray Both Nostrils DAILY    hydrocortisone (CORTEF) tablet 20 mg  20 mg Oral 7am    methocarbamol (ROBAXIN) tablet 500 mg  500 mg Oral BID PRN    lisdexamfetamine (VYVANSE) capsule 20 mg (Patient Supplied)  20 mg Oral DAILY    acetaminophen (TYLENOL) tablet 650 mg  650 mg Oral Q4H PRN    hydrocortisone (CORTEF) tablet 10 mg  10 mg Oral DAILY    dilTIAZem (CARDIZEM) IR tablet 30 mg  30 mg Oral AC&HS    zolpidem (AMBIEN) tablet 5 mg  5 mg Oral QHS PRN    hydrOXYzine HCl (ATARAX) tablet 25 mg  25 mg Oral QHS       I have reviewed the nurses notes, vitals, problem list, allergy list, medical history, family, social history and medications. REVIEW OF SYMPTOMS      General: Pt denies excessive weight gain or loss. Pt is able to conduct ADL's  HEENT: Denies blurred vision, headaches, hearing loss, epistaxis and difficulty swallowing. Respiratory: Denies cough, congestion, shortness of breath, BRANDON, wheezing or stridor. Cardiovascular: Denies precordial pain, palpitations, edema or PND  Gastrointestinal: Denies poor appetite, indigestion, abdominal pain or blood in stool  Genitourinary: Denies hematuria, dysuria, increased urinary frequency  Musculoskeletal: Denies joint pain or swelling from muscles or joints  Neurologic: Denies tremor, paresthesias, headache, or sensory motor disturbance  Psychiatric: Denies confusion, insomnia, depression  Integumentray: Denies rash, itching or ulcers.   Hematologic: Denies easy bruising, bleeding       PHYSICAL EXAMINATION      Vitals:    06/23/19 0630 06/23/19 0800 06/23/19 0806 06/23/19 1210   BP:   127/80 127/82   Pulse: (!) 102 98 99   Resp:   18 16   Temp:   97.9 °F (36.6 °C) 98.2 °F (36.8 °C)   SpO2:  98% 96% 96%   Weight:       Height:         General: Well developed, in no acute distress. HEENT: No jaundice, oral mucosa moist, no oral ulcers  Neck: Supple, no stiffness, no lymphadenopathy, supple  Heart:  Normal S1/S2 negative S3 or S4. Regular, no murmur, gallop or rub, no jugular venous distention  Respiratory: Clear bilaterally x 4, no wheezing or rales  Abdomen:   Soft, non-tender, bowel sounds are active.   Extremities:  No edema, normal cap refill, no cyanosis. Musculoskeletal: No clubbing, no deformities  Neuro: A&Ox3, speech clear, gait stable, cooperative, no focal neurologic deficits  Skin: Skin color is normal. No rashes or lesions. Non diaphoretic, moist.  Vascular: 2+ pulses symmetric in all extremities       DIAGNOSTIC DATA      TELEMETRY: Long RP tachycardia 130s       LABORATORY DATA      Lab Results   Component Value Date/Time    WBC 8.9 06/20/2019 10:05 AM    Hemoglobin (POC) 13.9 09/07/2017 05:10 PM    HGB 11.9 06/20/2019 10:05 AM    Hematocrit (POC) 41 09/07/2017 05:10 PM    HCT 39.1 06/20/2019 10:05 AM    PLATELET 548 33/93/1487 10:05 AM    MCV 89.1 06/20/2019 10:05 AM      Lab Results   Component Value Date/Time    Sodium 139 06/22/2019 12:37 AM    Potassium 4.4 06/22/2019 12:37 AM    Chloride 109 (H) 06/22/2019 12:37 AM    CO2 24 06/22/2019 12:37 AM    Anion gap 6 06/22/2019 12:37 AM    Glucose 191 (H) 06/22/2019 12:37 AM    BUN 12 06/22/2019 12:37 AM    Creatinine 0.84 06/22/2019 12:37 AM    BUN/Creatinine ratio 14 06/22/2019 12:37 AM    GFR est AA >60 06/22/2019 12:37 AM    GFR est non-AA >60 06/22/2019 12:37 AM    Calcium 9.0 06/22/2019 12:37 AM    Bilirubin, total 0.3 09/07/2017 05:08 PM    AST (SGOT) 13 (L) 09/07/2017 05:08 PM    Alk.  phosphatase 83 09/07/2017 05:08 PM    Protein, total 7.5 09/07/2017 05:08 PM    Albumin 3.6 09/07/2017 05:08 PM    Globulin 3.9 09/07/2017 05:08 PM    A-G Ratio 0.9 (L) 09/07/2017 05:08 PM    ALT (SGPT) 27 09/07/2017 05:08 PM           ASSESSMENT      1. Tachycardia  2. Anxiety  3. Depression  4. OCD  5. CMT        PLAN     DC tikosyn; plan for OP EPS at [facility] with MAC. If found to have an AT, will ablate. If found to have IST, will defer ablation and monitor for future deleterious cardiac impact of tachycardia while awaiting ability to restart ivabradine or an alternate agent in the future rather than risk need for pacemaker from SN ablation.          Savannah Weathers MD  Cardiac Electrophysiology / Cardiology    [address]  [address] [facility], [address] [facility], Suite [facility]  Christiana Burris     Jorge Sandersmoaislinn  [phone] / [phone] Fax   [phone] / [phone] Fax

## 2019-06-23 NOTE — PROGRESS NOTES
Date ClCr QTc Dose   2nd dose    6/21 0929 >120 with  @ 1039  495 @1104 250 mcg   3rd dose  Due: 6/21 2100 >100  469   250 mcg   4th dose  Due: 6/22 0942  >100 424  250 mcg    5th dose  Due: 6/22 2145 >100  436 @2345  250 mcg    Last QTC completed

## 2019-06-23 NOTE — PROGRESS NOTES
1900  Bedside and Verbal shift change report given to Rosales Rosenberg (oncoming nurse) by [place] (offgoing nurse). Report included the following information SBAR, Kardex, Procedure Summary, Intake/Output, MAR, Accordion, Recent Results and Cardiac Rhythm NSR- Sinus Tachy. Patient on the bed. Initial assessment done. Aware to call before getting out of bed. Call bell within reach. 1930  Updated Dr. Alberto Castillo about patient's QTC which is 469. Ok to give next dose of Tikosyn. 2145  Due meds given. 2345  EKG 2 hours post tikosyn done. QTC is 436. Visit Vitals  BP 93/58 (BP 1 Location: Right arm, BP Patient Position: At rest)   Pulse 100   Temp 97.9 °F (36.6 °C)   Resp 18   Ht 5' (1.524 m)   Wt 102.3 kg (225 lb 8 oz)   SpO2 97%   BMI 44.04 kg/m²     0700  Bedside and Verbal shift change report given to Kathleen Frey RN (oncoming nurse) by Antwon Monterroso RN (offgoing nurse). Report included the following information SBAR, Kardex, Procedure Summary, Intake/Output, MAR, Accordion and Recent Results.

## 2019-06-24 ENCOUNTER — TELEPHONE (OUTPATIENT)
Dept: CARDIOLOGY CLINIC | Age: 29
End: 2019-06-24

## 2019-06-24 LAB
ATRIAL RATE: 121 BPM
ATRIAL RATE: 97 BPM
CALCULATED P AXIS, ECG09: 31 DEGREES
CALCULATED P AXIS, ECG09: 35 DEGREES
CALCULATED R AXIS, ECG10: 21 DEGREES
CALCULATED R AXIS, ECG10: 4 DEGREES
CALCULATED T AXIS, ECG11: -8 DEGREES
CALCULATED T AXIS, ECG11: -9 DEGREES
DIAGNOSIS, 93000: NORMAL
DIAGNOSIS, 93000: NORMAL
P-R INTERVAL, ECG05: 126 MS
P-R INTERVAL, ECG05: 128 MS
Q-T INTERVAL, ECG07: 328 MS
Q-T INTERVAL, ECG07: 370 MS
QRS DURATION, ECG06: 82 MS
QRS DURATION, ECG06: 82 MS
QTC CALCULATION (BEZET), ECG08: 465 MS
QTC CALCULATION (BEZET), ECG08: 469 MS
VENTRICULAR RATE, ECG03: 121 BPM
VENTRICULAR RATE, ECG03: 97 BPM

## 2019-06-24 NOTE — TELEPHONE ENCOUNTER
----- Message from Ashok Hogan MD sent at 6/23/2019  3:12 PM EDT -----  Follow up level 3 ([id number])      Discharge performed    Needs to be shceudled for svt ablation asap at [facility] with mac

## 2019-07-01 ENCOUNTER — ANESTHESIA EVENT (OUTPATIENT)
Dept: CARDIAC CATH/INVASIVE PROCEDURES | Age: 29
End: 2019-07-01
Payer: MEDICARE

## 2019-07-05 RX ORDER — SODIUM CHLORIDE 0.9 % (FLUSH) 0.9 %
5-40 SYRINGE (ML) INJECTION EVERY 8 HOURS
Status: CANCELLED | OUTPATIENT
Start: 2019-07-05

## 2019-07-05 RX ORDER — SODIUM CHLORIDE 0.9 % (FLUSH) 0.9 %
5-40 SYRINGE (ML) INJECTION AS NEEDED
Status: CANCELLED | OUTPATIENT
Start: 2019-07-05

## 2019-07-09 ENCOUNTER — ANESTHESIA (OUTPATIENT)
Dept: CARDIAC CATH/INVASIVE PROCEDURES | Age: 29
End: 2019-07-09
Payer: MEDICARE

## 2019-07-09 ENCOUNTER — HOSPITAL ENCOUNTER (OUTPATIENT)
Age: 29
Setting detail: OUTPATIENT SURGERY
Discharge: HOME | End: 2019-07-09
Attending: INTERNAL MEDICINE | Admitting: INTERNAL MEDICINE
Payer: MEDICARE

## 2019-07-09 VITALS
OXYGEN SATURATION: 98 % | SYSTOLIC BLOOD PRESSURE: 106 MMHG | TEMPERATURE: 98.2 F | WEIGHT: 229.13 LBS | BODY MASS INDEX: 44.98 KG/M2 | DIASTOLIC BLOOD PRESSURE: 56 MMHG | RESPIRATION RATE: 5 BRPM | HEART RATE: 119 BPM | HEIGHT: 60 IN

## 2019-07-09 DIAGNOSIS — I47.1 SVT (SUPRAVENTRICULAR TACHYCARDIA) (HCC): ICD-10-CM

## 2019-07-09 LAB — HCG UR QL: NEGATIVE

## 2019-07-09 PROCEDURE — C1733 CATH, EP, OTHR THAN COOL-TIP: HCPCS | Performed by: INTERNAL MEDICINE

## 2019-07-09 PROCEDURE — 77030039046 HC PAD DEFIB RADIOTRNSPNT CNMD -B: Performed by: INTERNAL MEDICINE

## 2019-07-09 PROCEDURE — 93613 INTRACARDIAC EPHYS 3D MAPG: CPT | Performed by: INTERNAL MEDICINE

## 2019-07-09 PROCEDURE — 93620 COMP EP EVL R AT VEN PAC&REC: CPT | Performed by: INTERNAL MEDICINE

## 2019-07-09 PROCEDURE — 93623 PRGRMD STIMJ&PACG IV RX NFS: CPT | Performed by: INTERNAL MEDICINE

## 2019-07-09 PROCEDURE — 77030027107 HC PTCH EXT REF CARTO3 J&J -F: Performed by: INTERNAL MEDICINE

## 2019-07-09 PROCEDURE — 74011250636 HC RX REV CODE- 250/636

## 2019-07-09 PROCEDURE — 77030029065 HC DRSG HEMO QCLOT ZMED -B: Performed by: INTERNAL MEDICINE

## 2019-07-09 PROCEDURE — 76060000034 HC ANESTHESIA 1.5 TO 2 HR: Performed by: INTERNAL MEDICINE

## 2019-07-09 PROCEDURE — 74011000250 HC RX REV CODE- 250

## 2019-07-09 PROCEDURE — 93621 COMP EP EVL L PAC&REC C SINS: CPT | Performed by: INTERNAL MEDICINE

## 2019-07-09 PROCEDURE — 74011000258 HC RX REV CODE- 258

## 2019-07-09 PROCEDURE — 81025 URINE PREGNANCY TEST: CPT

## 2019-07-09 PROCEDURE — C1894 INTRO/SHEATH, NON-LASER: HCPCS | Performed by: INTERNAL MEDICINE

## 2019-07-09 PROCEDURE — C1730 CATH, EP, 19 OR FEW ELECT: HCPCS | Performed by: INTERNAL MEDICINE

## 2019-07-09 RX ORDER — PROPOFOL 10 MG/ML
INJECTION, EMULSION INTRAVENOUS AS NEEDED
Status: DISCONTINUED | OUTPATIENT
Start: 2019-07-09 | End: 2019-07-09 | Stop reason: HOSPADM

## 2019-07-09 RX ORDER — SODIUM CHLORIDE 0.9 % (FLUSH) 0.9 %
5-40 SYRINGE (ML) INJECTION AS NEEDED
Status: DISCONTINUED | OUTPATIENT
Start: 2019-07-09 | End: 2019-07-09 | Stop reason: HOSPADM

## 2019-07-09 RX ORDER — SODIUM CHLORIDE 0.9 % (FLUSH) 0.9 %
5-40 SYRINGE (ML) INJECTION EVERY 8 HOURS
Status: DISCONTINUED | OUTPATIENT
Start: 2019-07-09 | End: 2019-07-09 | Stop reason: HOSPADM

## 2019-07-09 RX ORDER — ACETAMINOPHEN 325 MG/1
650 TABLET ORAL
Status: DISCONTINUED | OUTPATIENT
Start: 2019-07-09 | End: 2019-07-09 | Stop reason: HOSPADM

## 2019-07-09 RX ORDER — HYDROCORTISONE SODIUM SUCCINATE 100 MG/2ML
INJECTION, POWDER, FOR SOLUTION INTRAMUSCULAR; INTRAVENOUS AS NEEDED
Status: DISCONTINUED | OUTPATIENT
Start: 2019-07-09 | End: 2019-07-09 | Stop reason: HOSPADM

## 2019-07-09 RX ORDER — ONDANSETRON 2 MG/ML
4 INJECTION INTRAMUSCULAR; INTRAVENOUS
Status: DISCONTINUED | OUTPATIENT
Start: 2019-07-09 | End: 2019-07-09 | Stop reason: HOSPADM

## 2019-07-09 RX ORDER — MIDAZOLAM HYDROCHLORIDE 1 MG/ML
INJECTION, SOLUTION INTRAMUSCULAR; INTRAVENOUS AS NEEDED
Status: DISCONTINUED | OUTPATIENT
Start: 2019-07-09 | End: 2019-07-09 | Stop reason: HOSPADM

## 2019-07-09 RX ORDER — DEXMEDETOMIDINE HYDROCHLORIDE 4 UG/ML
INJECTION, SOLUTION INTRAVENOUS AS NEEDED
Status: DISCONTINUED | OUTPATIENT
Start: 2019-07-09 | End: 2019-07-09 | Stop reason: HOSPADM

## 2019-07-09 RX ORDER — SODIUM CHLORIDE 9 MG/ML
INJECTION, SOLUTION INTRAVENOUS
Status: DISCONTINUED | OUTPATIENT
Start: 2019-07-09 | End: 2019-07-09 | Stop reason: HOSPADM

## 2019-07-09 RX ORDER — HYDROCODONE BITARTRATE AND ACETAMINOPHEN 5; 325 MG/1; MG/1
1 TABLET ORAL
Status: DISCONTINUED | OUTPATIENT
Start: 2019-07-09 | End: 2019-07-09 | Stop reason: HOSPADM

## 2019-07-09 RX ADMIN — PROPOFOL 30 MG: 10 INJECTION, EMULSION INTRAVENOUS at 10:08

## 2019-07-09 RX ADMIN — PROPOFOL 20 MG: 10 INJECTION, EMULSION INTRAVENOUS at 09:12

## 2019-07-09 RX ADMIN — PROPOFOL 20 MG: 10 INJECTION, EMULSION INTRAVENOUS at 09:16

## 2019-07-09 RX ADMIN — SODIUM CHLORIDE: 9 INJECTION, SOLUTION INTRAVENOUS at 08:40

## 2019-07-09 RX ADMIN — PROPOFOL 20 MG: 10 INJECTION, EMULSION INTRAVENOUS at 09:36

## 2019-07-09 RX ADMIN — PROPOFOL 30 MG: 10 INJECTION, EMULSION INTRAVENOUS at 10:06

## 2019-07-09 RX ADMIN — MIDAZOLAM HYDROCHLORIDE 0.5 MG: 1 INJECTION, SOLUTION INTRAMUSCULAR; INTRAVENOUS at 09:33

## 2019-07-09 RX ADMIN — DEXMEDETOMIDINE HYDROCHLORIDE 5 MCG: 4 INJECTION, SOLUTION INTRAVENOUS at 09:19

## 2019-07-09 RX ADMIN — PROPOFOL 20 MG: 10 INJECTION, EMULSION INTRAVENOUS at 09:21

## 2019-07-09 RX ADMIN — MIDAZOLAM HYDROCHLORIDE 1 MG: 1 INJECTION, SOLUTION INTRAMUSCULAR; INTRAVENOUS at 09:26

## 2019-07-09 RX ADMIN — DEXMEDETOMIDINE HYDROCHLORIDE 5 MCG: 4 INJECTION, SOLUTION INTRAVENOUS at 09:09

## 2019-07-09 RX ADMIN — DEXMEDETOMIDINE HYDROCHLORIDE 5 MCG: 4 INJECTION, SOLUTION INTRAVENOUS at 09:28

## 2019-07-09 RX ADMIN — PROPOFOL 30 MG: 10 INJECTION, EMULSION INTRAVENOUS at 10:04

## 2019-07-09 RX ADMIN — PROPOFOL 20 MG: 10 INJECTION, EMULSION INTRAVENOUS at 09:33

## 2019-07-09 RX ADMIN — DEXMEDETOMIDINE HYDROCHLORIDE 5 MCG: 4 INJECTION, SOLUTION INTRAVENOUS at 09:24

## 2019-07-09 RX ADMIN — PROPOFOL 20 MG: 10 INJECTION, EMULSION INTRAVENOUS at 09:09

## 2019-07-09 RX ADMIN — HYDROCORTISONE SODIUM SUCCINATE 100 MG: 100 INJECTION, POWDER, FOR SOLUTION INTRAMUSCULAR; INTRAVENOUS at 09:04

## 2019-07-09 RX ADMIN — MIDAZOLAM HYDROCHLORIDE 1 MG: 1 INJECTION, SOLUTION INTRAMUSCULAR; INTRAVENOUS at 09:11

## 2019-07-09 RX ADMIN — PROPOFOL 20 MG: 10 INJECTION, EMULSION INTRAVENOUS at 09:27

## 2019-07-09 RX ADMIN — PROPOFOL 20 MG: 10 INJECTION, EMULSION INTRAVENOUS at 09:24

## 2019-07-09 RX ADMIN — PROPOFOL 20 MG: 10 INJECTION, EMULSION INTRAVENOUS at 09:19

## 2019-07-09 RX ADMIN — DEXMEDETOMIDINE HYDROCHLORIDE 5 MCG: 4 INJECTION, SOLUTION INTRAVENOUS at 09:14

## 2019-07-09 RX ADMIN — PROPOFOL 20 MG: 10 INJECTION, EMULSION INTRAVENOUS at 09:30

## 2019-07-09 RX ADMIN — MIDAZOLAM HYDROCHLORIDE 1 MG: 1 INJECTION, SOLUTION INTRAMUSCULAR; INTRAVENOUS at 09:19

## 2019-07-09 RX ADMIN — MIDAZOLAM HYDROCHLORIDE 0.5 MG: 1 INJECTION, SOLUTION INTRAMUSCULAR; INTRAVENOUS at 09:31

## 2019-07-09 RX ADMIN — MIDAZOLAM HYDROCHLORIDE 1 MG: 1 INJECTION, SOLUTION INTRAMUSCULAR; INTRAVENOUS at 10:00

## 2019-07-09 NOTE — DISCHARGE INSTRUCTIONS
Electrophysiology Study (EPS)/Cardiac Ablation   Discharge Instructions      You have just had an electrophysiology study. There were catheters temporarily placed in your heart through a puncture in the Veins and/or Arteries in your groin. WHAT TO EXPECT      If you have had a Cardiac Ablation please be aware that you may experience mild chest pain that will resolve within 24 hours. If the Chest Pain begins radiating down your shoulders or arms, becomes severe or breathing becomes difficult, call 911 or go to the closest emergency room.  Mild to moderate, non-painful, bruising at the puncture site is not un-common, and will resolve in 7 - 10 days.  If a closure device was used to seal your artery or vein, a separate pamphlet will be given to you with these discharge instructions. It is very important that you review the information in the pamphlet for different restrictions and precautions.  You have a small gauze dressing applied to the puncture site in your groin. You may remove this the following morning. MEDICATIONS      Take only the medications prescribed to you at discharge. ACTIVITY      A responsible adult must take you home. Do not drive a car for 24 hours.  Rest quietly for the remainder of the day.  Do not lift anything greater than 10 pounds for 3 days.  Limit bending at the puncture site and use of stairs for at least 3 days.  You may remove the bandage and shower the morning after the procedure. Do not take a bath for 3 days. SYMPTOMS THAT NEED TO BE REPORTED IMMEDIATELY      Bleeding at the puncture site. If there is bleeding, lie down and hold firm direct pressure for at least 5 minutes. If the bleeding does not stop, go to the closest emergency room, or call 911.  Temperature more than 100.5 F.   Redness or warmth at the puncture site.    Increasing pain, numbness, coolness or blue discoloration of the extremity where the puncture is located.  Pulsating mass at the puncture site.  A new lump at the puncture site, or increasing swelling at the site.  Bruising at the puncture site that enlarges or becomes painful (some bruising at the site is common and will go away in 7 - 10 days).  Rapid heart rate or palpitations.  Dizziness, lightheadedness, fainting.  REMEMBER: If you feel something is an emergency or cannot be handled over the phone, call 911 or go to the closest emergency room.       Carla Sample     Dr. Jovan Hare in 1 month        Meri Brody MD  Cardiac Electrophysiology / Cardiology    [address]  [address] [facility], [address]  [phone] / [phone] Fax   [phone] / [phone] Fax

## 2019-07-09 NOTE — PROCEDURES
Cardiac Electrophysiology Report      PATIENT INFORMATION     Patient Name: Meseret Moreno  MRN: [id number]                             Study Date: 2019    YOB: 1990   Age: 30 y.o. Gender: female      Procedure:  Electrophysiology StudyRa         STAFF     Duty Name   Electrophysiologist Charo Gutierrez MD   Monitor Anesthesia service   Circulator Lakeisha Sol RN; Thierno Marrero RN; MISAEL Carcamo       PATIENT HISTORY     30year old female with Charcot-Yesi Tooth syndrome, anxiety and recurrent long RP tachycardia poorly tolerant/controlled with drug therapy presenting for electrophysiology study and possible supraventricular tachycardia ablation. PROCEDURE     The patient was brought to the Cardiac Electrophysiology laboratory in a post-absorptive, fasting state. Informed consent was obtained. A peripheral IV was in place. Continuous electrocardiographic, blood pressure, O2 saturation and  CO2 monitoring was initiated. Self-adhesive cardioversion patches were positioned on the chest.  Conscious sedation was administered per protocol. The patient was then prepped and draped in the usual sterile fashion. Both groins were infiltrated with a 50/50 mixture of Lidocaine (1%) and bupivicaine (0.5%). Vascular access was obtained in the right common femoral vein using an 8F, two 6F and a 7F sheath. Through these sheaths, quadripolar catheters and a deflectable decapolar catheter were positioned at the high right atrium, His, RV apex/outlfow tract and coronary sinus. Baseline intervals were recorded and found to be normal. Programmed atrial extrastimulation was performed as well as atrial burst pacing. Programmed ventricular extrastimulation was performed with delivery of up to triple extrastimuli at from the RV apex and outflow tract. Ventricular burst pacing was performed from both sites as well.  Ventricular tachycardia could not be induced despite these measures. At the conclusion of the procedure,  all catheters and sheaths were removed  and hemostasis obtained by direct manual compression. The patient remained hemodynamically stable, tolerated the procedure well and was transferred in stable condition. There were no immediate complications encountered during the procedure. CONDUCTION INTERVALS     See attached report       FINDINGS     1. The baseline ECG revealed sinus rhythm without ventricular preexcitation  2. The intracardiac intervals were normal (HV = 41 msec). 3. AV susan function was normal   4. Retrograde conduction was concentric and decremental.  5. Programmed atrial extrastimulation, atrial burst pacing from the high right atrium as well as the coronary sinus on/off/during washout of isuprel infusion failed to induce supraventricular tachycardia. RADIOLOGY SUMMARY      Total   Fluoro Time (minutes) 2.1   Dose Area Product (mGy) 93       CONCLUSIONS     1. Unable to induce supraventricular tachycardia. 2. Continue drug therapy for now; reattempt coverage for Ivabradine  3. Follow up in 1 month in EP clinic.            Stephany Culver MD  Cardiac Electrophysiology / Cardiology    [address]  [address] [place] [address]  [place]iana Burris[address] [facility]  [phone] / [phone] Fax [phone] / [phone] Fax

## 2019-07-09 NOTE — ANESTHESIA PREPROCEDURE EVALUATION
Relevant Problems   No relevant active problems       Anesthetic History   No history of anesthetic complications            Review of Systems / Medical History  Patient summary reviewed, nursing notes reviewed and pertinent labs reviewed    Pulmonary  Within defined limits      Sleep apnea    Asthma        Neuro/Psych   Within defined limits      Psychiatric history     Cardiovascular  Within defined limits          Dysrhythmias       Exercise tolerance: <4 METS  Comments: Normal echo   GI/Hepatic/Renal  Within defined limits   GERD: poorly controlled           Endo/Other  Within defined limits      Obesity     Other Findings   Comments: Steroid dependent         Physical Exam    Airway  Mallampati: I  TM Distance: 4 - 6 cm  Neck ROM: normal range of motion   Mouth opening: Normal     Cardiovascular  Regular rate and rhythm,  S1 and S2 normal,  no murmur, click, rub, or gallop             Dental  No notable dental hx       Pulmonary  Breath sounds clear to auscultation               Abdominal  GI exam deferred       Other Findings            Anesthetic Plan    ASA: 2  Anesthesia type: general                Steroid prep,

## 2019-07-09 NOTE — PROGRESS NOTES
TRANSFER - IN REPORT:    Verbal report received from SUDEEP BLOCK RN on Wilfred Stover  being received from cath lab procedure area  for routine progression of care. Report consisted of patients Situation, Background, Assessment and Recommendations(SBAR). Information from the following report(s) Kardex and Procedure Summary was reviewed with the receiving clinician. Opportunity for questions and clarification was provided. Assessment completed upon patients arrival to [address] [place] [address] and care assumed. Cardiac Cath Lab Recovery Arrival Note:    Wilfred Stover arrived to [facility] recovery area. Patient procedure= Ablation. Patient on cardiac monitor, non-invasive blood pressure, SPO2 monitor. On RA   IV  of NS on pump at 25 ml/hr. Patient status doing well without problems. Patient is A&Ox 3. Patient reports no pain. PROCEDURE SITE CHECK:    Procedure site:without any bleeding and no hematoma, No pain/discomfort reported at procedure site. No change in patient status. Continue to monitor patient and status.

## 2019-07-09 NOTE — H&P
HISTORY OF PRESENTING ILLNESS      Coy Herrera is a 33 y.o. female with CMT, IST/POTS presenting after being diagnosed with secondary adrenal insufficiency recently. She had overall noticed acceptable HR's on her Fitbit however had episodes where her HR elevated and resulted in fatigue. These episodes were usually being driven by pain, etc however she noticed her HR was slow in de-escalating.  She was continued on a as needed diltiazem regimen.  However she was subsequently hospitalized in [place] with progression of her secondary adrenal insufficiency.  While in the emergency room she experienced 1 of her clinical \"disconnects\" where he became unresponsive.  She reported ER staff stating she was exhibiting recurrent episodes of ventricular tachycardia at the time.  Strips of this were not available for review.  She was back to taking diltiazem daily. She underwent injection of a loop recorder to monitor for ventricular arrhythmias. ILR thus far reveals only sinus tachycardia with oversensing.  She triggered the ILR once (though not for her full blown clinical episode) during which SR was observed.          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    Secondary adrenal insufficiency ([facility] [facility] 08/02/2018    Severe obesity (BMI 35.0-39.9) 07/31/2018    Inappropriate sinus tachycardia 08/11/2016    Tachycardia 07/08/2015    Syncope and collapse 06/27/2012    Bipolar disorder, unspecified ([facility] [address] 06/27/2012    Orthostatic hypotension 06/27/2012    UTI (urinary tract infection) 06/27/2012             PAST MEDICAL HISTORY           Past Medical History:   Diagnosis Date    Adrenal insufficiency (HCC)      Asthma      Charcot-Yesi-Tooth disease      Orthostatic hypotension      Other ill-defined conditions(799.89)       neurologicaly induced CMT    Psychiatric disorder       bipolar             PAST SURGICAL HISTORY            Past Surgical History:   Procedure Laterality Date    HX ORTHOPAEDIC         bilat feet    TN INSERTION SUBQ CARDIAC RHYTHM MONITOR W/PRGRMG N/A 2/8/2019     LOOP RECORDER INSERT performed by Howie Olguin MD [facility]             ALLERGIES            Allergies   Allergen Reactions    Benadryl [Diphenhydramine Hcl] Other (comments)       hallucinations    Diphenhydramine Other (comments)    Hydromorphone Other (comments)    Morphine Shortness of Breath    Percocet [Oxycodone-Acetaminophen] Itching            FAMILY HISTORY            Family History   Problem Relation Age of Onset    Cancer Mother      Alcohol abuse Maternal Aunt      Diabetes Maternal Grandmother      Hypertension Maternal Grandmother      Cancer Paternal Grandmother      Diabetes Paternal Grandmother      Alcohol abuse Paternal Grandfather      negative for cardiac disease         SOCIAL HISTORY      Social History               Socioeconomic History    Marital status: SINGLE       Spouse name: Not on file    Number of children: Not on file    Years of education: Not on file    Highest education level: Not on file   Tobacco Use    Smoking status: Never Smoker    Smokeless tobacco: Never Used   Substance and Sexual Activity    Alcohol use: No       Alcohol/week: 0.0 oz    Drug use: No    Sexual activity: Yes       Partners: Male       Birth control/protection: Inserts               MEDICATIONS           Current Outpatient Medications   Medication Sig    phosphorus (VIRT-PHOS 250 NEUTRAL) 250 mg tablet Take 1 Tab by mouth three (3) times daily.  hydrocortisone sodium succ/PF (SOLU-CORTEF, PF,) 100 mg/2 mL solr (Act-O-Vial) 100 mg intramuscular once in the emergency    ibuprofen (MOTRIN) 800 mg tablet TK 1 T PO BID PRN    BREO ELLIPTA 200-25 mcg/dose inhaler Take 1 Puff by inhalation daily.  magnesium 250 mg tab Take  by mouth daily.     dilTIAZem CD (CARDIZEM CD) 120 mg ER capsule Take 1 tablet per day if needed for fast heart rate. Maximum dosage - 1 capsule every 24 hours.  hydrocortisone (CORTEF) 10 mg tablet 1 tablet PO in AM and 1/2 tablet 5 PM (Patient taking differently: 2  tablets PO in AM and 1 tablet 5 PM)    topiramate (TOPAMAX) 50 mg tablet Take 50 mg by mouth three (3) times daily.  hydrOXYzine HCl (ATARAX) 25 mg tablet Take 25 mg by mouth nightly.  olopatadine (PATADAY) 0.2 % drop ophthalmic solution Administer 1 Drop to both eyes as needed.  fluticasone (FLONASE) 50 mcg/actuation nasal spray 2 Sprays by Both Nostrils route daily.  VYVANSE 20 mg capsule 20 mg once daily    montelukast (SINGULAIR) 10 mg tablet Take 10 mg by mouth daily.  Cetirizine (ZYRTEC) 10 mg cap Take 10 mg by mouth daily.  clomiPRAMINE (ANAFRANIL) 50 mg capsule Take 50 mg by mouth nightly.  PROAIR HFA 90 mcg/actuation inhaler INHALE 2 PUFFS PO Q 4 TO 6 H PRN AND 10-15 MINUTES BEFORE EXERCISE    escitalopram oxalate (LEXAPRO) 20 mg tablet Take 20 mg by mouth daily.  clonazePAM (KLONOPIN) 0.5 mg tablet Take 0.5 mg by mouth three (3) times daily as needed.  LAMOTRIGINE (LAMICTAL PO) Take 100 mg by mouth every other day.  NORETHINDRONE (JOLIVETTE PO) Take 0.35 mg by mouth daily.      No current facility-administered medications for this visit.          I have reviewed the nurses notes, vitals, problem list, allergy list, medical history, family, social history and medications.         REVIEW OF SYMPTOMS      General: Pt denies excessive weight gain or loss. Pt is able to conduct ADL's  HEENT: Denies blurred vision, headaches, hearing loss, epistaxis and difficulty swallowing. Respiratory: Denies cough, congestion, shortness of breath, BRANDON, wheezing or stridor.   Cardiovascular: Denies precordial pain, palpitations, edema or PND  Gastrointestinal: Denies poor appetite, indigestion, abdominal pain or blood in stool  Genitourinary: Denies hematuria, dysuria, increased urinary frequency  Musculoskeletal: Denies joint pain or swelling from muscles or joints  Neurologic: Denies tremor, paresthesias, headache, or sensory motor disturbance  Psychiatric: Denies confusion, insomnia, depression  Integumentray: Denies rash, itching or ulcers. Hematologic: Denies easy bruising, bleeding         PHYSICAL EXAMINATION      There were no vitals filed for this visit. General: Well developed, in no acute distress. HEENT: No jaundice, oral mucosa moist, no oral ulcers  Neck: Supple, no stiffness, no lymphadenopathy, supple  Heart:  Normal S1/S2 negative S3 or S4. Regular, no murmur, gallop or rub, no jugular venous distention  Respiratory: Clear bilaterally x 4, no wheezing or rales  Abdomen:   Soft, non-tender, bowel sounds are active.   Extremities:  No edema, normal cap refill, no cyanosis. Musculoskeletal: No clubbing, no deformities  Neuro: A&Ox3, speech clear, gait stable, cooperative, no focal neurologic deficits  Skin: Skin color is normal. No rashes or lesions.  Non diaphoretic, moist.  Vascular: 2+ pulses symmetric in all extremities         DIAGNOSTIC DATA      EKG:          LABORATORY DATA            Lab Results   Component Value Date/Time     WBC 6.0 09/07/2017 05:08 PM     Hemoglobin (POC) 13.9 09/07/2017 05:10 PM     HGB 13.3 09/07/2017 05:08 PM     Hematocrit (POC) 41 09/07/2017 05:10 PM     HCT 41.5 09/07/2017 05:08 PM     PLATELET 970 67/85/0202 05:08 PM     MCV 91.0 09/07/2017 05:08 PM            Lab Results   Component Value Date/Time     Sodium 141 06/27/2012 07:50 PM     Potassium 4.2 06/27/2012 07:50 PM     Chloride 106 06/27/2012 07:50 PM     CO2 29 06/27/2012 07:50 PM     Anion gap 6 06/27/2012 07:50 PM     Glucose 83 06/27/2012 07:50 PM     BUN 7 06/27/2012 07:50 PM     Creatinine 0.8 06/27/2012 07:50 PM     BUN/Creatinine ratio 9 (L) 06/27/2012 07:50 PM     GFR est AA >60 06/27/2012 07:50 PM     GFR est non-AA >60 06/27/2012 07:50 PM     Calcium 9.2 06/27/2012 07:50 PM     Bilirubin, total 0.3 09/07/2017 05:08 PM     AST (SGOT) 13 (L) 09/07/2017 05:08 PM     Alk. phosphatase 83 09/07/2017 05:08 PM     Protein, total 7.5 09/07/2017 05:08 PM     Albumin 3.6 09/07/2017 05:08 PM     Globulin 3.9 09/07/2017 05:08 PM     A-G Ratio 0.9 (L) 09/07/2017 05:08 PM     ALT (SGPT) 27 09/07/2017 05:08 PM             ASSESSMENT      1. CMT  2. Dizziness and lightheadedness              A. Occuring randomly with bending, sitting, climbing stairs  3. POTS vs. IST              A. Narrow complex tachycardia consistent with ST on 30 day monitor  4. Fatigue  5. Syncope   6.  VT?         PLAN      SVT ablation           Kathleen Felix MD  Cardiac Electrophysiology / Cardiology     [address] [place] [address], [id number] [place] [address], [address]  [place] [address] [place], [place] [address]  [phone] / [phone] Fax                                    [phone] / [phone] Fax

## 2019-07-09 NOTE — PROGRESS NOTES
TRANSFER - OUT REPORT:    Verbal report given to Josef Goel RN on Gilberto Lopez being transferred to cath lab recovery for routine progression of care       Report consisted of patients Situation, Background, Assessment and   Recommendations(SBAR). Information from the following report(s) Procedure Summary was reviewed with the receiving nurse. Opportunity for questions and clarification was provided. Cardiac Cath Lab Procedure Area Arrival Note:    Gilberto Lopez arrived to Cardiac Cath Lab, Procedure Area. Patient identifiers verified with NAME and DATE OF BIRTH. Procedure verified with patient. Consent forms verified. Allergies verified. Patient informed of procedure and plan of care. Questions answered with review. Patient voiced understanding of procedure and plan of care. Patient on cardiac monitor, non-invasive blood pressure, SPO2 monitor. Airway management and monitoring along with all vital signs to be performed by CRNA. Patient status doing well without problems. Patient is A&Ox 4. Patient reports no pain. Patient medicated during procedure with orders obtained and verified by Dr. Chiqui Lane. Refer to patients Cardiac Cath Lab PROCEDURE REPORT for vital signs, assessment, status, and response during procedure, printed at end of case. Printed report on chart or scanned into chart.

## 2019-07-09 NOTE — ROUTINE PROCESS
Cardiac Cath Lab Recovery Arrival Note: 
 
 
Rico Menendez arrived to Cardiac Cath Lab, Recovery Area. Staff introduced to patient. Patient identifiers verified with NAME and DATE OF BIRTH. Procedure verified with patient. Consent forms reviewed and signed by patient or authorized representative and verified. Allergies verified. Patient and family oriented to department. Patient and family informed of procedure and plan of care. Questions answered with review. Patient prepped for procedure, per orders from physician, prior to arrival. 
 
Patient on cardiac monitor, non-invasive blood pressure, SPO2 monitor. On RA. Patient is A&Ox 4. Patient reports no pain. Patient in stretcher, in low position, with side rails up, call bell within reach, patient instructed to call if assistance as needed. Patient prep in: [address], [place] [address]. Patient family has pager # Family in: . Prep by: ANNITA Ferguson RN 
 Body Location Override (Optional - Billing Will Still Be Based On Selected Body Map Location If Applicable): right dorsal hand superior X Size Of Lesion In Cm (Optional): 0 Detail Level: Detailed Body Location Override (Optional - Billing Will Still Be Based On Selected Body Map Location If Applicable): right dorsal hand inferior

## 2019-07-09 NOTE — ANESTHESIA POSTPROCEDURE EVALUATION
Procedure(s):  Ep Study Complete  Ep 3d Mapping  Drug Stimulation. MAC    Anesthesia Post Evaluation        Patient location during evaluation: PACU  Note status: Adequate. Level of consciousness: responsive to verbal stimuli and sleepy but conscious  Pain management: satisfactory to patient  Airway patency: patent  Anesthetic complications: no  Cardiovascular status: acceptable  Respiratory status: acceptable  Hydration status: acceptable  Comments: +Post-Anesthesia Evaluation and Assessment    Patient: Suly Fortune MRN: [id number]  SSN: [id number]   YOB: 1990  Age: 30 y.o. Sex: female          Cardiovascular Function/Vital Signs    BP (P) 106/68 (BP Patient Position: At rest)   Pulse 92   Temp 36.8 °C (98.2 °F)   Resp (!) 0   Ht 5' (1.524 m)   Wt 103.9 kg (229 lb 2 oz)   SpO2 97%   Breastfeeding? No   BMI 44.75 kg/m²     Patient is status post Procedure(s):  Ep Study Complete  Ep 3d Mapping  Drug Stimulation. Nausea/Vomiting: Controlled. Postoperative hydration reviewed and adequate. Pain:  Pain Scale 1: Numeric (0 - 10) (07/09/19 0745)  Pain Intensity 1: 0 (07/09/19 0745)   Managed. Neurological Status: At baseline. Mental Status and Level of Consciousness: Arousable. Pulmonary Status:   O2 Device: Room air (07/09/19 1045)   Adequate oxygenation and airway patent. Complications related to anesthesia: None    Post-anesthesia assessment completed. No concerns. I have evaluated the patient and the patient is stable and ready to be discharged from PACU . Signed By: Freedom Wilcox MD    7/9/2019        Vitals Value Taken Time   /53 7/9/2019 12:46 PM   Temp     Pulse 118 7/9/2019 12:54 PM   Resp 6 7/9/2019 12:54 PM   SpO2 98 % 7/9/2019 12:54 PM   Vitals shown include unvalidated device data.

## 2019-07-09 NOTE — Clinical Note
Bilateral groin prepped with ChloraPrep and draped. Wet prep solution applied at: 915. Wet prep solution dried at: 921. Wet prep elapsed drying time: 6 mins.

## 2019-07-15 ENCOUNTER — OFFICE VISIT (OUTPATIENT)
Dept: CARDIOLOGY CLINIC | Age: 29
End: 2019-07-15

## 2019-07-15 DIAGNOSIS — Z95.818 STATUS POST PLACEMENT OF IMPLANTABLE LOOP RECORDER: Primary | ICD-10-CM

## 2019-07-19 ENCOUNTER — DOCUMENTATION ONLY (OUTPATIENT)
Dept: CARDIOLOGY CLINIC | Age: 29
End: 2019-07-19

## 2019-07-19 NOTE — PROGRESS NOTES
Corlanor 5mg BID Approved today per Dinah EVANGELISTA Case: [id number], Status: Approved, Coverage Starts on: 7/19/2019 12:00:00 AM, Coverage Ends on: 7/18/2020 12:00:00 AM.

## 2019-08-15 ENCOUNTER — OFFICE VISIT (OUTPATIENT)
Dept: CARDIOLOGY CLINIC | Age: 29
End: 2019-08-15

## 2019-08-15 DIAGNOSIS — Z95.818 STATUS POST PLACEMENT OF IMPLANTABLE LOOP RECORDER: Primary | ICD-10-CM

## 2019-09-16 ENCOUNTER — OFFICE VISIT (OUTPATIENT)
Dept: CARDIOLOGY CLINIC | Age: 29
End: 2019-09-16

## 2019-09-16 DIAGNOSIS — Z95.818 STATUS POST PLACEMENT OF IMPLANTABLE LOOP RECORDER: Primary | ICD-10-CM

## 2019-10-16 ENCOUNTER — OFFICE VISIT (OUTPATIENT)
Dept: CARDIOLOGY CLINIC | Age: 29
End: 2019-10-16

## 2019-10-16 DIAGNOSIS — Z95.818 STATUS POST PLACEMENT OF IMPLANTABLE LOOP RECORDER: Primary | ICD-10-CM

## 2019-10-24 ENCOUNTER — TELEPHONE (OUTPATIENT)
Dept: CARDIOLOGY CLINIC | Age: 29
End: 2019-10-24

## 2019-10-24 NOTE — TELEPHONE ENCOUNTER
Patient is calling to schedule with either Dr. Loyd Johnson or Janki Irizarry. Please advise.     Phone #: [phone]  Thanks

## 2019-11-27 ENCOUNTER — OFFICE VISIT (OUTPATIENT)
Dept: CARDIOLOGY CLINIC | Age: 29
End: 2019-11-27

## 2019-11-27 VITALS
HEIGHT: 60 IN | OXYGEN SATURATION: 95 % | WEIGHT: 227.6 LBS | HEART RATE: 94 BPM | SYSTOLIC BLOOD PRESSURE: 98 MMHG | DIASTOLIC BLOOD PRESSURE: 62 MMHG | BODY MASS INDEX: 44.68 KG/M2

## 2019-11-27 DIAGNOSIS — R00.0 INAPPROPRIATE SINUS TACHYCARDIA: ICD-10-CM

## 2019-11-27 DIAGNOSIS — I47.1 SVT (SUPRAVENTRICULAR TACHYCARDIA) (HCC): Primary | ICD-10-CM

## 2019-11-27 DIAGNOSIS — R55 SYNCOPE AND COLLAPSE: ICD-10-CM

## 2019-11-27 DIAGNOSIS — I95.1 ORTHOSTATIC HYPOTENSION: ICD-10-CM

## 2019-11-27 NOTE — PROGRESS NOTES
Saritha Bruno is a 30 y.o. female    Chief Complaint   Patient presents with    Dizziness    Irregular Heart Beat     tachy     RM #1    Chest pain patient states some flutters    SOB with exertion    Dizziness No    Swelling No    Refills corlanor    Visit Vitals  BP 98/62 (BP 1 Location: Left arm, BP Patient Position: Sitting)   Pulse 94   Ht 5' (1.524 m)   Wt 227 lb 9.6 oz (103.2 kg)   SpO2 95%   BMI 44.45 kg/m²       1. Have you been to the ER, urgent care clinic since your last visit? Hospitalized since your last visit? [facility] 7/9/19    2. Have you seen or consulted any other health care providers outside of the [address] [place] [address] since your last visit? Include any pap smears or colon screening.   No

## 2019-11-27 NOTE — PROGRESS NOTES
HISTORY OF PRESENTING ILLNESS      Gurvinder Polo is a 30 y.o. female Charcot-Yesi Tooth syndrome, anxiety and recurrent long RP tachycardia poorly tolerant/controlled with drug therapy who underwent electrophysiology study and possible supraventricular tachycardia ablation. SVT was unable to be induced and Corlanor was restarted. She feels occasional flutters. Her ILR shows episodes of long RP tachycardia. Longest episode was 6 minutes during sleeping hours. She still has dizziness- no further syncope.        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    Secondary adrenal insufficiency ([address] 08/02/2018    Severe obesity (BMI 35.0-39.9) 07/31/2018    Inappropriate sinus tachycardia 08/11/2016    Tachycardia 07/08/2015    Syncope and collapse 06/27/2012    Bipolar disorder, unspecified ([address] 06/27/2012    Orthostatic hypotension 06/27/2012    UTI (urinary tract infection) 06/27/2012           PAST MEDICAL HISTORY     Past Medical History:   Diagnosis Date    Adrenal insufficiency (HCC)     Asthma     Charcot-Yesi-Tooth disease     Orthostatic hypotension     Other ill-defined conditions(799.89)     neurologicaly induced CMT    Psychiatric disorder     bipolar           PAST SURGICAL HISTORY     Past Surgical History:   Procedure Laterality Date    HX ORTHOPAEDIC      bilat feet    DC COMPRE ELECTROPHYSIOL XM W/LEFT ATRIAL PACNG/REC N/A 7/9/2019    Lt Atrial Pace & Record During Ep Study performed by Edward Berrios MD at [address] CATH LAB    DC COMPRE ELECTROPHYSIOLOGIC ARRHYTHMIA INDUCTION N/A 7/9/2019    Ep Study Complete performed by Edward Berrios MD at [address] CATH LAB    DC INSERTION [address] W/PRGRMG N/A 2/8/2019    LOOP RECORDER INSERT performed by Edward Berrios MD at [address] [place] [address] CATH LAB    DC INTRACARDIAC ELECTROPHYSIOLOGIC 3D MAPPING N/A 7/9/2019    Ep 3d Mapping performed by Edward Berrios MD at [address] [facility] SHEILA LION    STIM/PACING HEART POST IV DRUG INFU N/A 7/9/2019    Drug Stimulation performed by Edward Berrios MD at [address] [facility] [address] SHEILA LION          ALLERGIES     Allergies   Allergen Reactions    Benadryl [Diphenhydramine Hcl] Other (comments)     hallucinations    Diphenhydramine Other (comments)    Hydromorphone Other (comments)    Morphine Shortness of Breath    Percocet [Oxycodone-Acetaminophen] Itching          FAMILY HISTORY     Family History   Problem Relation Age of Onset    Cancer Mother     Alcohol abuse Maternal Aunt     Diabetes Maternal Grandmother     Hypertension Maternal Grandmother     Cancer Paternal Grandmother     Diabetes Paternal Grandmother     Alcohol abuse Paternal Grandfather     negative for cardiac disease       SOCIAL HISTORY     Social History     Socioeconomic History    Marital status: SINGLE     Spouse name: Not on file    Number of children: Not on file    Years of education: Not on file    Highest education level: Not on file   Tobacco Use    Smoking status: Never Smoker    Smokeless tobacco: Never Used   Substance and Sexual Activity    Alcohol use: No     Alcohol/week: 0.0 standard drinks    Drug use: No    Sexual activity: Yes     Partners: Male     Birth control/protection: Inserts         MEDICATIONS     Current Outpatient Medications   Medication Sig    ivabradine (CORLANOR) 5 mg tablet Take 1 Tab by mouth two (2) times daily (with meals).  celecoxib (CELEBREX) 200 mg capsule Take 200 mg by mouth two (2) times a day. Indications: Joint Damage causing Pain and Loss of Function    methocarbamol (ROBAXIN) 500 mg tablet Take 500 mg by mouth two (2) times a day.  hydrocortisone (CORTEF) 10 mg tablet Take 20 mg by mouth three (3) times daily.  hydrocortisone (CORTEF) 10 mg tablet Take 10 mg by mouth daily (after lunch).  Patient takes at [place] [address]  omeprazole (PRILOSEC) 40 mg capsule Take 40 mg by mouth Daily (before breakfast).  hydrOXYzine HCl (ATARAX) 25 mg tablet Take 50 mg by mouth nightly.  olopatadine (PATADAY) 0.2 % drop ophthalmic solution Administer 1 Drop to both eyes daily as needed (itching eyes).  fluticasone (FLONASE) 50 mcg/actuation nasal spray 2 Sprays by Both Nostrils route daily.  VYVANSE 20 mg capsule 20 mg once daily    montelukast (SINGULAIR) 10 mg tablet Take 10 mg by mouth daily.  Cetirizine (ZYRTEC) 10 mg cap Take 10 mg by mouth daily.  PROAIR HFA 90 mcg/actuation inhaler INHALE 2 PUFFS PO Q 4 TO 6 H PRN AND 10-15 MINUTES BEFORE EXERCISE    escitalopram oxalate (LEXAPRO) 20 mg tablet Take 20 mg by mouth daily.  clonazePAM (KLONOPIN) 0.5 mg tablet Take 0.5 mg by mouth three (3) times daily as needed.  NORETHINDRONE (JOLIVETTE PO) Take 0.35 mg by mouth nightly.  fluvoxaMINE (LUVOX) 50 mg tablet Take 1 Tab by mouth nightly. Indications: Obsessive Compulsive Disorder (Patient not taking: Reported on 11/27/2019)    risperiDONE (RISPERDAL) 0.5 mg tablet Take 1 Tab by mouth two (2) times a day. Indications: Bipolar I Disorder with Most Recent Episode Mixed (Patient not taking: Reported on 11/27/2019)     No current facility-administered medications for this visit. I have reviewed the nurses notes, vitals, problem list, allergy list, medical history, family, social history and medications. REVIEW OF SYMPTOMS      General: Pt denies excessive weight gain or loss. Pt is able to conduct ADL's  HEENT: Denies blurred vision, headaches, hearing loss, epistaxis and difficulty swallowing. Respiratory: Denies cough, congestion, shortness of breath, BRANDON, wheezing or stridor.   Cardiovascular: Denies precordial pain, palpitations, edema or PND  Gastrointestinal: Denies poor appetite, indigestion, abdominal pain or blood in stool  Genitourinary: Denies hematuria, dysuria, increased urinary frequency  Musculoskeletal: Denies joint pain or swelling from muscles or joints  Neurologic: Denies tremor, paresthesias, headache, or sensory motor disturbance  Psychiatric: Denies confusion, insomnia, depression  Integumentray: Denies rash, itching or ulcers. Hematologic: Denies easy bruising, bleeding       PHYSICAL EXAMINATION      Vitals:    11/27/19 1456   BP: 98/62   Pulse: 94   SpO2: 95%   Weight: 227 lb 9.6 oz (103.2 kg)   Height: 5' (1.524 m)     General: Well developed, in no acute distress. HEENT: No jaundice, oral mucosa moist, no oral ulcers  Neck: Supple, no stiffness, no lymphadenopathy, supple  Heart:  Normal S1/S2 negative S3 or S4. Regular, no murmur, gallop or rub, no jugular venous distention  Respiratory: Clear bilaterally x 4, no wheezing or rales  Abdomen:   Soft, non-tender, bowel sounds are active.   Extremities:  No edema, normal cap refill, no cyanosis. Musculoskeletal: No clubbing, no deformities  Neuro: A&Ox3, speech clear, gait stable, cooperative, no focal neurologic deficits  Skin: Skin color is normal. No rashes or lesions.  Non diaphoretic, moist.  Vascular: 2+ pulses symmetric in all extremities       DIAGNOSTIC DATA      EKG:        LABORATORY DATA      Lab Results   Component Value Date/Time    WBC 8.9 06/20/2019 10:05 AM    Hemoglobin (POC) 13.9 09/07/2017 05:10 PM    HGB 11.9 06/20/2019 10:05 AM    Hematocrit (POC) 41 09/07/2017 05:10 PM    HCT 39.1 06/20/2019 10:05 AM    PLATELET 562 57/68/3380 10:05 AM    MCV 89.1 06/20/2019 10:05 AM      Lab Results   Component Value Date/Time    Sodium 139 06/22/2019 12:37 AM    Potassium 4.4 06/22/2019 12:37 AM    Chloride 109 (H) 06/22/2019 12:37 AM    CO2 24 06/22/2019 12:37 AM    Anion gap 6 06/22/2019 12:37 AM    Glucose 191 (H) 06/22/2019 12:37 AM    BUN 12 06/22/2019 12:37 AM    Creatinine 0.84 06/22/2019 12:37 AM    BUN/Creatinine ratio 14 06/22/2019 12:37 AM    GFR est AA >60 06/22/2019 12:37 AM    GFR est non-AA >60 06/22/2019 12:37 AM    Calcium 9.0 06/22/2019 12:37 AM    Bilirubin, total 0.3 09/07/2017 05:08 PM    AST (SGOT) 13 (L) 09/07/2017 05:08 PM    Alk. phosphatase 83 09/07/2017 05:08 PM    Protein, total 7.5 09/07/2017 05:08 PM    Albumin 3.6 09/07/2017 05:08 PM    Globulin 3.9 09/07/2017 05:08 PM    A-G Ratio 0.9 (L) 09/07/2017 05:08 PM    ALT (SGPT) 27 09/07/2017 05:08 PM           ASSESSMENT      1. CMT  2. Dizziness and lightheadedness              A. Occuring randomly with bending, sitting, climbing stairs  3. POTS vs. IST              A. Narrow complex tachycardia consistent with ST on 30 day monitor  4. Fatigue  5. Syncope   6. VT?      PLAN     Continue corlanor. Will obtain MRA to r/o May Thurner syndrome. Should this be negative, would consider adjustment to medical therapy if her symptoms progress. FOLLOW-UP       Thank you, None for allowing me to participate in the care of this extraordinarily pleasant female. Please do not hesitate to contact me for further questions/concerns.      Citlali Baker NP

## 2019-11-29 NOTE — TELEPHONE ENCOUNTER
Requested Prescriptions     Signed Prescriptions Disp Refills    ivabradine (CORLANOR) 5 mg tablet 180 Tab 1     Sig: Take 1 Tab by mouth two (2) times daily (with meals). Authorizing Provider: Hetal Bocanegra     Ordering User: Sarah Ontiveros     Refill per verbal order Dr. Amie Padilla.

## 2019-12-13 ENCOUNTER — HOSPITAL ENCOUNTER (OUTPATIENT)
Dept: MRI IMAGING | Age: 29
Discharge: HOME | End: 2019-12-13
Attending: NURSE PRACTITIONER
Payer: MEDICARE

## 2019-12-13 VITALS — BODY MASS INDEX: 42.97 KG/M2 | WEIGHT: 220 LBS

## 2019-12-13 DIAGNOSIS — R55 SYNCOPE AND COLLAPSE: ICD-10-CM

## 2019-12-13 DIAGNOSIS — I95.1 ORTHOSTATIC HYPOTENSION: ICD-10-CM

## 2019-12-13 DIAGNOSIS — R00.0 INAPPROPRIATE SINUS TACHYCARDIA: ICD-10-CM

## 2019-12-13 DIAGNOSIS — I47.1 SVT (SUPRAVENTRICULAR TACHYCARDIA) (HCC): ICD-10-CM

## 2019-12-13 PROCEDURE — 74011250636 HC RX REV CODE- 250/636: Performed by: RADIOLOGY

## 2019-12-13 PROCEDURE — 77030021566

## 2019-12-13 PROCEDURE — A9575 INJ GADOTERATE MEGLUMI 0.1ML: HCPCS | Performed by: RADIOLOGY

## 2019-12-13 PROCEDURE — 72198 MR ANGIO PELVIS W/O & W/DYE: CPT

## 2019-12-13 RX ORDER — GADOTERATE MEGLUMINE 376.9 MG/ML
19 INJECTION INTRAVENOUS
Status: COMPLETED | OUTPATIENT
Start: 2019-12-13 | End: 2019-12-13

## 2019-12-13 RX ADMIN — GADOTERATE MEGLUMINE 19 ML: 376.9 INJECTION INTRAVENOUS at 18:25

## 2020-01-31 DIAGNOSIS — R55 SYNCOPE AND COLLAPSE: Primary | ICD-10-CM

## 2020-01-31 NOTE — PROGRESS NOTES
MRA was negative for May Thurner. Will evaluate lower extremities to rule out venous insufficiency per Dr. Sampson Barrientos' plan.

## 2020-03-13 NOTE — TELEPHONE ENCOUNTER
Requested Prescriptions     Signed Prescriptions Disp Refills    ivabradine (Corlanor) 5 mg tablet 180 Tab 1     Sig: Take 1 Tab by mouth two (2) times daily (with meals). Authorizing Provider: Kolton Randle     Ordering User: Jeanette Cassette     Refill per verbal order Dr. Kelsy Solares.

## 2020-06-25 ENCOUNTER — DOCUMENTATION ONLY (OUTPATIENT)
Dept: CARDIOLOGY CLINIC | Age: 30
End: 2020-06-25

## 2020-07-07 ENCOUNTER — PATIENT MESSAGE (OUTPATIENT)
Dept: CARDIOLOGY CLINIC | Age: 30
End: 2020-07-07

## 2020-07-16 ENCOUNTER — DOCUMENTATION ONLY (OUTPATIENT)
Dept: CARDIOLOGY CLINIC | Age: 30
End: 2020-07-16

## 2020-07-16 NOTE — PROGRESS NOTES
Appeal of denial for coverage of Corlanor 7.5 mg started on 7/10/20 per DREA Archibald NP. Awaiting decision.

## 2020-07-20 RX ORDER — IVABRADINE 7.5 MG/1
TABLET, FILM COATED ORAL
Qty: 30 TAB | Refills: 0 | Status: SHIPPED | OUTPATIENT
Start: 2020-07-20 | End: 2020-07-24 | Stop reason: ALTCHOICE

## 2020-07-24 RX ORDER — PROPAFENONE HYDROCHLORIDE 225 MG/1
225 CAPSULE, EXTENDED RELEASE ORAL 2 TIMES DAILY
Qty: 60 CAP | Refills: 3 | Status: SHIPPED | OUTPATIENT
Start: 2020-07-24 | End: 2020-09-01

## 2020-08-31 NOTE — PROGRESS NOTES
HISTORY OF PRESENTING ILLNESS      Kevin Medellin is a 35 y.o. female Charcot-Yesi Tooth syndrome, anxiety and recurrent long RP tachycardia poorly tolerant/controlled with drug therapy who underwent electrophysiology study and possible supraventricular tachycardia ablation. SVT was unable to be induced and Corlanor was restarted. She feels occasional flutters. Her ILR shows episodes of long RP tachycardia. Most recent was August 15th, less than one minute. She feels like her tachycardia is most controlled with Corlanor. MRA for May Thurner was negative. She continues to note spikes of heart rates into the 200s on her fitbit, particularly while sleeping. She reports episodes of lightheadedness that seem to be worse during the summer months.         PAST MEDICAL HISTORY     Past Medical History:   Diagnosis Date    Adrenal insufficiency (HCC)     Asthma     Charcot-Yesi-Tooth disease     Orthostatic hypotension     Other ill-defined conditions(799.89)     neurologicaly induced CMT    Psychiatric disorder     bipolar           PAST SURGICAL HISTORY     Past Surgical History:   Procedure Laterality Date    HX ORTHOPAEDIC      bilat feet    [facility] COMPRE ELECTROPHYSIOL XM W/LEFT ATRIAL PACNG/REC N/A 7/9/2019    Lt Atrial Pace & Record During Ep Study performed by Jesus Muse MD at [place] [address] CATH LAB    VA COMPRE ELECTROPHYSIOLOGIC ARRHYTHMIA INDUCTION N/A 7/9/2019    Ep Study Complete performed by Jesus Muse MD at [place] [address] CATH LAB    VA INSERTION [address]/PRGRMG N/A 2/8/2019    LOOP RECORDER INSERT performed by Jesus Muse MD at [address] CATH LAB    VA INTRACARDIAC ELECTROPHYSIOLOGIC 3D MAPPING N/A 7/9/2019    Ep 3d Mapping performed by Jesus Muse MD at [place] [address] CATH LAB    STIM/PACING HEART POST IV DRUG INFU N/A 7/9/2019    Drug Stimulation performed by Jesus Muse MD at [place] [address] CATH LAB          ALLERGIES     Allergies   Allergen Reactions    Benadryl [Diphenhydramine Hcl] Other (comments)     hallucinations    Diphenhydramine Other (comments)    Hydromorphone Other (comments)    Morphine Shortness of Breath    Percocet [Oxycodone-Acetaminophen] Itching          FAMILY HISTORY     Family History   Problem Relation Age of Onset    Cancer Mother     Alcohol abuse Maternal Aunt     Diabetes Maternal Grandmother     Hypertension Maternal Grandmother     Cancer Paternal Grandmother     Diabetes Paternal Grandmother     Alcohol abuse Paternal Grandfather     negative for cardiac disease       SOCIAL HISTORY     Social History     Socioeconomic History    Marital status: SINGLE     Spouse name: Not on file    Number of children: Not on file    Years of education: Not on file    Highest education level: Not on file   Tobacco Use    Smoking status: Never Smoker    Smokeless tobacco: Never Used   Substance and Sexual Activity    Alcohol use: No     Alcohol/week: 0.0 standard drinks    Drug use: No    Sexual activity: Yes     Partners: Male     Birth control/protection: Inserts         MEDICATIONS     Current Outpatient Medications   Medication Sig    ivabradine (Corlanor) 7.5 mg tablet Take  by mouth two (2) times daily (with meals).  clomiPRAMINE (ANAFRANIL) 75 mg capsule Take 75 mg by mouth nightly.  adalimumab (Humira Pen) 40 mg/0.8 mL injection pen 40 mg by SubCUTAneous route every fourteen (14) days.  risperiDONE (RISPERDAL) 0.5 mg tablet Take 1 Tab by mouth two (2) times a day. Indications: Bipolar I Disorder with Most Recent Episode Mixed (Patient taking differently: Take 0.5 mg by mouth daily. Indications: bipolar I disorder with most recent episode mixed)    celecoxib (CELEBREX) 200 mg capsule Take 200 mg by mouth two (2) times a day. Indications: Joint Damage causing Pain and Loss of Function    methocarbamol (ROBAXIN) 500 mg tablet Take 500 mg by mouth two (2) times a day.     hydrocortisone (CORTEF) 10 mg tablet Take 20 mg by mouth daily (after lunch). 2 tablets in the morning, 1 tablet in the afternoon    omeprazole (PRILOSEC) 40 mg capsule Take 40 mg by mouth Daily (before breakfast).  hydrOXYzine HCl (ATARAX) 25 mg tablet Take 50 mg by mouth nightly.  olopatadine (PATADAY) 0.2 % drop ophthalmic solution Administer 1 Drop to both eyes daily as needed (itching eyes).  fluticasone (FLONASE) 50 mcg/actuation nasal spray 2 Sprays by Both Nostrils route daily.  VYVANSE 20 mg capsule 20 mg once daily    Cetirizine (ZYRTEC) 10 mg cap Take 10 mg by mouth daily.  PROAIR HFA 90 mcg/actuation inhaler INHALE 2 PUFFS PO Q 4 TO 6 H PRN AND 10-15 MINUTES BEFORE EXERCISE    escitalopram oxalate (LEXAPRO) 20 mg tablet Take 20 mg by mouth daily.  clonazePAM (KLONOPIN) 0.5 mg tablet Take 0.5 mg by mouth three (3) times daily as needed.  NORETHINDRONE (JOLIVETTE PO) Take 0.35 mg by mouth nightly. No current facility-administered medications for this visit. I have reviewed the nurses notes, vitals, problem list, allergy list, medical history, family, social history and medications. REVIEW OF SYMPTOMS      General: +lightheadeness, Pt denies excessive weight gain or loss. Pt is able to conduct ADL's  HEENT: Denies blurred vision, headaches, hearing loss, epistaxis and difficulty swallowing. Respiratory: Denies cough, congestion, shortness of breath, BRANDON, wheezing or stridor. Cardiovascular: +tachycardia, Denies precordial pain, palpitations, edema or PND  Gastrointestinal: Denies poor appetite, indigestion, abdominal pain or blood in stool  Genitourinary: Denies hematuria, dysuria, increased urinary frequency  Musculoskeletal: Denies joint pain or swelling from muscles or joints  Neurologic: Denies tremor, paresthesias, headache, or sensory motor disturbance  Psychiatric: Denies confusion, insomnia, depression  Integumentray: Denies rash, itching or ulcers.   Hematologic: Denies easy bruising, bleeding PHYSICAL EXAMINATION      Vitals: see vitals section  General: Well developed, in no acute distress. HEENT: No jaundice, oral mucosa moist, no oral ulcers  Neck: Supple, no stiffness, no lymphadenopathy, supple  Heart:  Normal S1/S2 negative S3 or S4. Regular, no murmur, gallop or rub, no jugular venous distention  Respiratory: Clear bilaterally x 4, no wheezing or rales  Abdomen:   Soft, non-tender, bowel sounds are active. Extremities:  No edema, normal cap refill, no cyanosis. Musculoskeletal: No clubbing, no deformities  Neuro: A&Ox3, speech clear, gait stable, cooperative, no focal neurologic deficits  Skin: Skin color is normal. No rashes or lesions. Non diaphoretic, moist.  Vascular: 2+ pulses symmetric in all extremities       DIAGNOSTIC DATA      EKG:        LABORATORY DATA      Lab Results   Component Value Date/Time    WBC 8.9 06/20/2019 10:05 AM    Hemoglobin (POC) 13.9 09/07/2017 05:10 PM    HGB 11.9 06/20/2019 10:05 AM    Hematocrit (POC) 41 09/07/2017 05:10 PM    HCT 39.1 06/20/2019 10:05 AM    PLATELET 369 52/14/1434 10:05 AM    MCV 89.1 06/20/2019 10:05 AM      Lab Results   Component Value Date/Time    Sodium 139 06/22/2019 12:37 AM    Potassium 4.4 06/22/2019 12:37 AM    Chloride 109 (H) 06/22/2019 12:37 AM    CO2 24 06/22/2019 12:37 AM    Anion gap 6 06/22/2019 12:37 AM    Glucose 191 (H) 06/22/2019 12:37 AM    BUN 12 06/22/2019 12:37 AM    Creatinine 0.84 06/22/2019 12:37 AM    BUN/Creatinine ratio 14 06/22/2019 12:37 AM    GFR est AA >60 06/22/2019 12:37 AM    GFR est non-AA >60 06/22/2019 12:37 AM    Calcium 9.0 06/22/2019 12:37 AM    Bilirubin, total 0.3 09/07/2017 05:08 PM    Alk. phosphatase 83 09/07/2017 05:08 PM    Protein, total 7.5 09/07/2017 05:08 PM    Albumin 3.6 09/07/2017 05:08 PM    Globulin 3.9 09/07/2017 05:08 PM    A-G Ratio 0.9 (L) 09/07/2017 05:08 PM    ALT (SGPT) 27 09/07/2017 05:08 PM           ASSESSMENT       1. CMT  2.  Dizziness and lightheadedness              A. Occuring randomly with bending, sitting, climbing stairs  3. POTS vs. IST              A. Narrow complex tachycardia consistent with ST on 30 day monitor  4. Fatigue  5. Syncope   6. VT? PLAN     We discussed a few differing options including repeat EPS and neurology referral for possible ANS. Do not favor changing medications at this time as she has had adequate overall control with corlanor. Will re-attempt connection with insurance company regarding prior auth. She now lives in [place] and will research neurology offices and provide me with information for referral.Continue current medical therapy. ICD-10-CM ICD-9-CM    1. SVT (supraventricular tachycardia) (HCC)  I47.1 427.89 AMB POC EKG ROUTINE W/ 12 LEADS, INTER & REP   2. Inappropriate sinus tachycardia  R00.0 427.89    3. Syncope and collapse  R55 780.2    4. Status post placement of implantable loop recorder  Z95.818 V45.09    5. CMT (Charcot-Yesi-Tooth disease)  G60.0 356.1    6. Lightheadedness  R42 780.4      Orders Placed This Encounter    AMB POC EKG ROUTINE W/ 12 LEADS, INTER & REP     Order Specific Question:   Reason for Exam:     Answer: Tachy, Syncope    ivabradine (Corlanor) 7.5 mg tablet     Sig: Take  by mouth two (2) times daily (with meals).  clomiPRAMINE (ANAFRANIL) 75 mg capsule     Sig: Take 75 mg by mouth nightly.  adalimumab (Humira Pen) 40 mg/0.8 mL injection pen     Si mg by SubCUTAneous route every fourteen (14) days. FOLLOW-UP     1 year    Thank you, None for allowing me to participate in the care of this extraordinarily pleasant female. Please do not hesitate to contact me for further questions/concerns.      Leoncio Sun NP    [address], [facility], [place] [address]  Christiana Burris [place]  [phone] / [phone] Fax   [phone] / [phone] Fax

## 2020-09-01 ENCOUNTER — OFFICE VISIT (OUTPATIENT)
Dept: CARDIOLOGY CLINIC | Age: 30
End: 2020-09-01
Payer: COMMERCIAL

## 2020-09-01 VITALS
HEART RATE: 68 BPM | SYSTOLIC BLOOD PRESSURE: 114 MMHG | OXYGEN SATURATION: 99 % | BODY MASS INDEX: 42.97 KG/M2 | HEIGHT: 60 IN | DIASTOLIC BLOOD PRESSURE: 70 MMHG

## 2020-09-01 DIAGNOSIS — G60.0 CMT (CHARCOT-MARIE-TOOTH DISEASE): ICD-10-CM

## 2020-09-01 DIAGNOSIS — R55 SYNCOPE AND COLLAPSE: ICD-10-CM

## 2020-09-01 DIAGNOSIS — I47.1 SVT (SUPRAVENTRICULAR TACHYCARDIA) (HCC): Primary | ICD-10-CM

## 2020-09-01 DIAGNOSIS — Z95.818 STATUS POST PLACEMENT OF IMPLANTABLE LOOP RECORDER: ICD-10-CM

## 2020-09-01 DIAGNOSIS — R00.0 INAPPROPRIATE SINUS TACHYCARDIA: ICD-10-CM

## 2020-09-01 DIAGNOSIS — R42 LIGHTHEADEDNESS: ICD-10-CM

## 2020-09-01 PROCEDURE — 93000 ELECTROCARDIOGRAM COMPLETE: CPT | Performed by: NURSE PRACTITIONER

## 2020-09-01 PROCEDURE — 99215 OFFICE O/P EST HI 40 MIN: CPT | Performed by: NURSE PRACTITIONER

## 2020-09-01 RX ORDER — ADALIMUMAB 40MG/0.8ML
40 KIT SUBCUTANEOUS
COMMUNITY

## 2020-09-01 RX ORDER — CLOMIPRAMINE HYDROCHLORIDE 75 MG/1
75 CAPSULE ORAL
COMMUNITY

## 2020-09-01 NOTE — PROGRESS NOTES
ROOM 1  Visit Vitals  /70 (BP 1 Location: Left arm, BP Patient Position: Sitting)   Pulse 68   Ht 5' (1.524 m)   SpO2 99%   BMI 42.97 kg/m²         ILR    Fast rhythms  Episodes of syncope or \"come close\"  Easily out of breath     PCP Prerna Velez MD in [place]    Chest pain: no  Shortness of breath: no  Edema: no  Palpitations, Skipped beats, Rapid heartbeat: no  Dizziness: Occasionally    New diagnosis/Surgeries: no    ER/Hospitalizations: no    Refills:

## 2020-09-29 ENCOUNTER — TELEPHONE (OUTPATIENT)
Dept: CARDIOLOGY CLINIC | Age: 30
End: 2020-09-29

## 2020-09-29 NOTE — TELEPHONE ENCOUNTER
Called [facility] @ [phone]. Spoke with Adelina simmons: appeal for Corlanor 7.5 mg bid. Provided all information as requested. Requested Urgent review. Case # [id number] is under review for 24 hours.

## 2020-11-11 ENCOUNTER — TELEPHONE (OUTPATIENT)
Dept: CARDIOLOGY CLINIC | Age: 30
End: 2020-11-11

## 2020-11-11 NOTE — TELEPHONE ENCOUNTER
Patient is transferring care and needs to  [place] as she has relocated and is requesting that the practice release her information through [facility]. Please advise.     Phone: [phone]

## 2020-11-11 NOTE — TELEPHONE ENCOUNTER
Released pt in [address] to her [facility] [address] for her Lin[facility] follow up. Sent her a [employer] message to inform her.

## 2022-01-02 RX ORDER — IVABRADINE 7.5 MG/1
TABLET, FILM COATED ORAL
Qty: 30 TABLET | Refills: 1 | Status: SHIPPED | OUTPATIENT
Start: 2022-01-02

## 2022-03-19 PROBLEM — Z51.81 VISIT FOR MONITORING TIKOSYN THERAPY: Status: ACTIVE | Noted: 2019-06-20

## 2022-03-19 PROBLEM — E27.49 SECONDARY ADRENAL INSUFFICIENCY (HCC): Status: ACTIVE | Noted: 2018-08-02

## 2022-03-19 PROBLEM — Z79.899 VISIT FOR MONITORING TIKOSYN THERAPY: Status: ACTIVE | Noted: 2019-06-20

## 2022-03-19 PROBLEM — G60.0 CMT (CHARCOT-MARIE-TOOTH DISEASE): Status: ACTIVE | Noted: 2018-09-15

## (undated) DEVICE — PINNACLE INTRODUCER SHEATH: Brand: PINNACLE

## (undated) DEVICE — Device

## (undated) DEVICE — REM POLYHESIVE ADULT PATIENT RETURN ELECTRODE: Brand: VALLEYLAB

## (undated) DEVICE — SUTURE VCRL SZ 3-0 L27IN ABSRB UD L26MM SH 1/2 CIR J416H

## (undated) DEVICE — DRAPE PRB US TRNSDCR 6X96IN --

## (undated) DEVICE — CABLE RMFG EP C3 10/BLK 12YEL -- F/CARTO 3 SYS - OEM 323592

## (undated) DEVICE — CATHETER EP 6FR L92CM 2-5-2MM SPC TIP 1MM 4 ELECTRD D CRV

## (undated) DEVICE — DRESSING HEMOSTATIC SFT INTVENT W/O SLT DBL WRP QUIKCLOT LF

## (undated) DEVICE — PATCH CARTO 3 EXT REF --

## (undated) DEVICE — KENDALL RADIOLUCENT FOAM MONITORING ELECTRODE RECTANGULAR SHAPE: Brand: KENDALL

## (undated) DEVICE — STERILE POLYISOPRENE POWDER-FREE SURGICAL GLOVES: Brand: PROTEXIS

## (undated) DEVICE — CATHETER ELECTROPHYSIOLOGY D 2-5-2 MM 1 MM 6 FRX115 CM 4 FIX

## (undated) DEVICE — CABLE CATH L10FT BLU CONN 10-34 PIN ELECTROGRAM CONDUCTION

## (undated) DEVICE — PACK PROCEDURE SURG HRT CATH

## (undated) DEVICE — DRSG AQUACEL SURG 3.5X6IN -- CONVERT TO ITEM 369227

## (undated) DEVICE — Device: Brand: PADPRO